# Patient Record
Sex: FEMALE | Race: OTHER | HISPANIC OR LATINO | Employment: UNEMPLOYED | URBAN - METROPOLITAN AREA
[De-identification: names, ages, dates, MRNs, and addresses within clinical notes are randomized per-mention and may not be internally consistent; named-entity substitution may affect disease eponyms.]

---

## 2018-03-20 LAB — PROLACTIN (HISTORICAL): 16 NG/ML

## 2018-05-17 LAB
ALBUMIN SERPL BCP-MCNC: 4.7 G/DL (ref 3–5.2)
ALP SERPL-CCNC: 62 U/L (ref 43–122)
ALT SERPL W P-5'-P-CCNC: 22 U/L (ref 9–52)
AMORPHOUS MATERIAL (HISTORICAL): ABNORMAL
AMPHETAMINE URINE (HISTORICAL): NEGATIVE
ANION GAP SERPL CALCULATED.3IONS-SCNC: 9 MMOL/L (ref 5–14)
AST SERPL W P-5'-P-CCNC: 22 U/L (ref 14–36)
BACTERIA UR QL AUTO: ABNORMAL
BARBITURATE URINE (HISTORICAL): NEGATIVE
BENZODIAZEPINE URINE (HISTORICAL): POSITIVE
BILIRUB SERPL-MCNC: 1.1 MG/DL
BILIRUB UR QL STRIP: NEGATIVE MG/DL
BUN SERPL-MCNC: 17 MG/DL (ref 5–25)
CALCIUM SERPL-MCNC: 9.4 MG/DL (ref 8.4–10.2)
CASTS/CASTS TYPE (HISTORICAL): ABNORMAL /LPF
CHLORIDE SERPL-SCNC: 105 MEQ/L (ref 97–108)
CLARITY UR: CLEAR
CO2 SERPL-SCNC: 23 MMOL/L (ref 22–30)
COCAINE (METAB.), URINE (HISTORICAL): POSITIVE
COLOR UR: ABNORMAL
CREATINE, SERUM (HISTORICAL): 0.77 MG/DL (ref 0.6–1.2)
CRYSTAL TYPE (HISTORICAL): ABNORMAL /HPF
DEPRECATED RDW RBC AUTO: 12.9 %
DRUG COMMENT (HISTORICAL): ABNORMAL
EGFR (HISTORICAL): >60 ML/MIN/1.73 M2
GLUCOSE SERPL-MCNC: 85 MG/DL (ref 70–99)
GLUCOSE UR STRIP-MCNC: NEGATIVE MG/DL
HCT VFR BLD AUTO: 38.9 % (ref 36–46)
HGB BLD-MCNC: 12.9 G/DL (ref 12–16)
HGB UR QL STRIP.AUTO: ABNORMAL
KETONES UR STRIP-MCNC: 15 MG/DL
LEUKOCYTE ESTERASE UR QL STRIP: ABNORMAL
MCH RBC QN AUTO: 31.9 PG (ref 26–34)
MCHC RBC AUTO-ENTMCNC: 33.2 % (ref 31–36)
MCV RBC AUTO: 96 FL (ref 80–100)
MDMA (GC/MS) (HISTORICAL): NEGATIVE
METHADONE URINE (HISTORICAL): NEGATIVE
METHAMPHETAMINE URINE (HISTORICAL): NEGATIVE
MUCOUS THREADS URNS QL MICRO: ABNORMAL
NITRITE UR QL STRIP: NEGATIVE
NON-SQ EPI CELLS URNS QL MICRO: ABNORMAL
OPIATES (HISTORICAL): NEGATIVE
OTHER STN SPEC: ABNORMAL
OXYCODONE (HISTORICAL): NEGATIVE
PH UR STRIP.AUTO: 5 [PH] (ref 4.5–8)
PHENCYCLIDINE URINE (HISTORICAL): NEGATIVE
PLATELET # BLD AUTO: 165 K/MCL (ref 150–450)
POTASSIUM SERPL-SCNC: 4.2 MEQ/L (ref 3.6–5)
PREGNANCY TEST URINE (HISTORICAL): NEGATIVE
PROT UR STRIP-MCNC: >=500 MG/DL
RBC # BLD AUTO: 4.05 M/MCL (ref 4–5.2)
RBC #/AREA URNS AUTO: 2 /HPF
SODIUM SERPL-SCNC: 137 MEQ/L (ref 137–147)
SP GR UR STRIP.AUTO: 1.02 (ref 1–1.04)
THC URINE (HISTORICAL): POSITIVE
TOTAL PROTEIN (HISTORICAL): 7.5 G/DL (ref 5.9–8.4)
TRICYCLICS URINE (HISTORICAL): NEGATIVE
TSH SERPL DL<=0.05 MIU/L-ACNC: 0.85 UIU/ML (ref 0.47–4.68)
UROBILINOGEN UR QL STRIP.AUTO: 1 MG/DL (ref 0–1)
WBC # BLD AUTO: 7.1 K/MCL (ref 4.5–11)
WBC #/AREA URNS AUTO: 5 /HPF

## 2018-07-15 ENCOUNTER — HOSPITAL ENCOUNTER (EMERGENCY)
Facility: HOSPITAL | Age: 23
Discharge: HOME | End: 2018-07-15
Attending: EMERGENCY MEDICINE | Admitting: EMERGENCY MEDICINE
Payer: COMMERCIAL

## 2018-07-15 VITALS
SYSTOLIC BLOOD PRESSURE: 110 MMHG | RESPIRATION RATE: 18 BRPM | TEMPERATURE: 97.6 F | WEIGHT: 101 LBS | HEART RATE: 82 BPM | OXYGEN SATURATION: 100 % | DIASTOLIC BLOOD PRESSURE: 60 MMHG

## 2018-07-15 DIAGNOSIS — N10 ACUTE PYELONEPHRITIS: Primary | ICD-10-CM

## 2018-07-15 LAB
BACTERIA UR QL AUTO: ABNORMAL /HPF
BILIRUB UR QL STRIP: NEGATIVE
CLARITY UR: ABNORMAL
COLOR UR: YELLOW
EXT PREG TEST URINE: NORMAL
GLUCOSE UR STRIP-MCNC: NEGATIVE MG/DL
HGB UR QL STRIP.AUTO: 250
KETONES UR STRIP-MCNC: ABNORMAL MG/DL
LEUKOCYTE ESTERASE UR QL STRIP: 500
NITRITE UR QL STRIP: POSITIVE
NON-SQ EPI CELLS URNS QL MICRO: ABNORMAL /HPF
PH UR STRIP.AUTO: 5 [PH] (ref 4.5–8)
PROT UR STRIP-MCNC: >=500 MG/DL
RBC #/AREA URNS AUTO: ABNORMAL /HPF
SP GR UR STRIP.AUTO: 1.02 (ref 1–1.04)
UROBILINOGEN UA: NEGATIVE MG/DL
WBC #/AREA URNS AUTO: ABNORMAL /HPF

## 2018-07-15 PROCEDURE — 81001 URINALYSIS AUTO W/SCOPE: CPT | Performed by: PHYSICIAN ASSISTANT

## 2018-07-15 PROCEDURE — 81025 URINE PREGNANCY TEST: CPT | Performed by: PHYSICIAN ASSISTANT

## 2018-07-15 PROCEDURE — 99283 EMERGENCY DEPT VISIT LOW MDM: CPT

## 2018-07-15 RX ORDER — METHOCARBAMOL 500 MG/1
TABLET, FILM COATED ORAL
Status: DISPENSED
Start: 2018-07-15 | End: 2018-07-16

## 2018-07-15 RX ORDER — TRAZODONE HYDROCHLORIDE 50 MG/1
50 TABLET ORAL
COMMUNITY

## 2018-07-15 RX ORDER — CEPHALEXIN 500 MG/1
500 CAPSULE ORAL ONCE
Status: COMPLETED | OUTPATIENT
Start: 2018-07-15 | End: 2018-07-15

## 2018-07-15 RX ORDER — CEPHALEXIN 500 MG/1
CAPSULE ORAL
Status: DISPENSED
Start: 2018-07-15 | End: 2018-07-16

## 2018-07-15 RX ORDER — CEPHALEXIN 500 MG/1
500 CAPSULE ORAL EVERY 8 HOURS SCHEDULED
Qty: 21 CAPSULE | Refills: 0 | Status: SHIPPED | OUTPATIENT
Start: 2018-07-15 | End: 2018-07-22

## 2018-07-15 RX ORDER — METHOCARBAMOL 500 MG/1
500 TABLET, FILM COATED ORAL ONCE
Status: COMPLETED | OUTPATIENT
Start: 2018-07-15 | End: 2018-07-15

## 2018-07-15 RX ORDER — VALACYCLOVIR HYDROCHLORIDE 500 MG/1
500 TABLET, FILM COATED ORAL DAILY
COMMUNITY
Start: 2018-03-13

## 2018-07-15 RX ORDER — PRAZOSIN HYDROCHLORIDE 2 MG/1
2 CAPSULE ORAL
COMMUNITY

## 2018-07-15 RX ADMIN — METHOCARBAMOL 500 MG: 500 TABLET ORAL at 23:10

## 2018-07-15 RX ADMIN — CEPHALEXIN 500 MG: 500 CAPSULE ORAL at 23:32

## 2018-07-16 NOTE — ED NOTES
Patient reports left lumbar back pain since yesterday, reported 10/10  Patient denies abdominal pain, burning with urination  Patient reports she woke up with the back pain  Patient denies any heavy lifting, twisting  Reports the pain is intermittent, goes away for about 5 min and then comes back  Reports movement makes pain worse  Took "sometime" before she came but does not know what       Joleen Lester, RN  07/15/18 3640 [facility], RN  07/15/18 5725

## 2018-07-16 NOTE — DISCHARGE INSTRUCTIONS
Kidney Infection   WHAT YOU NEED TO KNOW:   A kidney infection, or pyelonephritis, is a bacterial infection  The infection usually starts in your bladder or urethra and moves into your kidney  One or both kidneys may be infected  DISCHARGE INSTRUCTIONS:   Return to the emergency department if:   · You have a fever and chills  · You cannot stop vomiting  · You have severe pain in your abdomen, lower back, or sides  Contact your healthcare provider if:   · You continue to have a fever after you take antibiotics for 3 days  · You have pain when you urinate, even after treatment  · Your signs and symptoms return  · You have questions or concerns about your condition or care  Medicines: You may  need any of the following:  · Antibiotics  treat your bacterial infection  · Acetaminophen  decreases pain and fever  It is available without a doctor's order  Ask how much to take and how often to take it  Follow directions  Read the labels of all other medicines you are using to see if they also contain acetaminophen, or ask your doctor or pharmacist  Acetaminophen can cause liver damage if not taken correctly  Do not use more than 4 grams (4,000 milligrams) total of acetaminophen in one day  · NSAIDs , such as ibuprofen, help decrease swelling, pain, and fever  This medicine is available with or without a doctor's order  NSAIDs can cause stomach bleeding or kidney problems in certain people  If you take blood thinner medicine, always ask if NSAIDs are safe for you  Always read the medicine label and follow directions  Do not give these medicines to children under 10months of age without direction from your child's healthcare provider  · Prescription pain medicine  may be given  Ask how to take this medicine safely  · Take your medicine as directed  Contact your healthcare provider if you think your medicine is not helping or if you have side effects   Tell him of her if you are allergic to any medicine  Keep a list of the medicines, vitamins, and herbs you take  Include the amounts, and when and why you take them  Bring the list or the pill bottles to follow-up visits  Carry your medicine list with you in case of an emergency  Drink liquids as directed: You may need to drink extra liquids to help flush your kidneys and urinary system  Water is the best liquid to drink  Ask your healthcare provider how much liquid to drink each day and which liquids are best for you  Urinate as soon as you feel the urge: This will help flush bacteria from your urinary system  Do not wait or hold your urine for too long  Clean your genital area every day with soap and water:  Wipe from front to back after you urinate or have a bowel movement  Wear cotton underwear  Fabrics such as nylon and polyester can stay damp  This can increase your risk for infection  Urinate within 15 minutes after you have sex  Follow up with your healthcare provider as directed:  Write down your questions so you remember to ask them during your visits  © 2017 [facility] Information is for End User's use only and may not be sold, redistributed or otherwise used for commercial purposes  All illustrations and images included in CareNotes® are the copyrighted property of A D A M [employer]  or [employer]  The above information is an  only  It is not intended as medical advice for individual conditions or treatments  Talk to your doctor, nurse or pharmacist before following any medical regimen to see if it is safe and effective for you

## 2018-07-16 NOTE — ED PROVIDER NOTES
History  Chief Complaint   Patient presents with    Back Pain     "I am having lower back pain in the back left side "     27-year-old otherwise healthy female presents to the emergency department for evaluation of acute nontraumatic left-sided back and flank pain x1 day  Pain began abruptly, has worsened since onset, currently rated 10/10  She describes sharp nonradiating pain to the left lower  She has taken 1 dose of ibuprofen without pain relief denies associated fever, chills, sweats, however she does note that she had 1 episode of nonbilious, nonbloody emesis yesterday  Denies associated lower urinary tract symptoms, vaginal discharge or bleeding  Has never experienced pain like this in the past   No history of abdominal surgeries  Last menstrual period was 2 weeks prior  Prior to Admission Medications   Prescriptions Last Dose Informant Patient Reported? Taking?   prazosin (MINIPRESS) 2 mg capsule Unknown at Unknown time  Yes No   Sig: Take 2 mg by mouth daily at bedtime   traZODone (DESYREL) 50 mg tablet Unknown at Unknown time  Yes No   Sig: Take 50 mg by mouth daily at bedtime   valACYclovir (VALTREX) 500 mg tablet   Yes Yes   Sig: Take 500 mg by mouth daily      Facility-Administered Medications: None       History reviewed  No pertinent past medical history  History reviewed  No pertinent surgical history  History reviewed  No pertinent family history  I have reviewed and agree with the history as documented  Social History   Substance Use Topics    Smoking status: Never Smoker    Smokeless tobacco: Never Used    Alcohol use No        Review of Systems   Constitutional: Positive for chills  Negative for appetite change, diaphoresis and fever  Respiratory: Negative for shortness of breath  Cardiovascular: Negative for chest pain  Gastrointestinal: Positive for nausea and vomiting (once)  Negative for abdominal pain and diarrhea     Genitourinary: Negative for difficulty urinating, dysuria, frequency and urgency  Musculoskeletal: Positive for back pain  Negative for arthralgias and myalgias  Skin: Negative for color change and rash  Allergic/Immunologic: Negative for immunocompromised state  Neurological: Negative for weakness and numbness  Psychiatric/Behavioral: Negative for confusion  Physical Exam  Physical Exam   Constitutional: She is oriented to person, place, and time  She appears well-developed and well-nourished  No distress  HENT:   Head: Normocephalic and atraumatic  Eyes: Pupils are equal, round, and reactive to light  No scleral icterus  Cardiovascular: Normal rate and regular rhythm  Exam reveals no gallop and no friction rub  No murmur heard  Pulmonary/Chest: No respiratory distress  She has no wheezes  She has no rales  Abdominal: Soft  Bowel sounds are normal  She exhibits no distension and no mass  There is tenderness (suprapubic, LUQ)  There is no rebound, no guarding and no CVA tenderness  Musculoskeletal:        Lumbar back: She exhibits tenderness (L paraspinal)  She exhibits normal range of motion and no bony tenderness  Neurological: She is alert and oriented to person, place, and time  Skin: Skin is warm and dry  Capillary refill takes less than 2 seconds  She is not diaphoretic  Psychiatric: She has a normal mood and affect  Her behavior is normal    Vitals reviewed        Vital Signs  ED Triage Vitals [07/15/18 2247]   Temperature Pulse Respirations Blood Pressure SpO2   97 6 °F (36 4 °C) 82 18 110/60 100 %      Temp Source Heart Rate Source Patient Position - Orthostatic VS BP Location FiO2 (%)   Temporal Monitor Sitting Left arm --      Pain Score       Worst Possible Pain           Vitals:    07/15/18 2247   BP: 110/60   Pulse: 82   Patient Position - Orthostatic VS: Sitting       Visual Acuity      ED Medications  Medications   methocarbamol (ROBAXIN) tablet 500 mg (500 mg Oral Given 7/15/18 2310) cephalexin (KEFLEX) capsule 500 mg (500 mg Oral Given 7/15/18 2332)       Diagnostic Studies  Results Reviewed     Procedure Component Value Units Date/Time    Urine Microscopic [[id number]]  (Abnormal) Collected:  07/15/18 2305    Lab Status:  Final result Specimen:  Urine from Urine, Clean Catch Updated:  07/15/18 2321     RBC, UA 20-30 (A) /hpf      WBC, UA 30-50 (A) /hpf      Epithelial Cells Moderate (A) /hpf      Bacteria, UA Innumerable (A) /hpf     UA w Reflex to Microscopic [[id number]]  (Abnormal) Collected:  07/15/18 2305    Lab Status:  Final result Specimen:  Urine from Urine, Clean Catch Updated:  07/15/18 2317     Color, UA Yellow     Clarity, UA Cloudy (A)     Specific Gravity, UA 1 025     pH, UA 5 0     Leukocytes,  0 (A)     Nitrite, UA Positive (A)     Protein, UA >=500 (A) mg/dl      Glucose, UA Negative mg/dl      Ketones, UA 5 (Trace) (A) mg/dl      Bilirubin, UA Negative     Blood,  0 (A)     UROBILINOGEN UA Negative mg/dL     POCT pregnancy, urine [[id number]]  (Normal) Resulted:  07/15/18 2310    Lab Status:  Final result Updated:  07/15/18 2310     EXT PREG TEST UR (Ref: Negative) neg                 No orders to display              Procedures  Procedures       Phone Contacts  ED Phone Contact    ED Course                               MDM  Number of Diagnoses or Management Options  Acute pyelonephritis: new and requires workup  Diagnosis management comments: 79-year-old previously healthy female presents to the emergency department for acute onset of left flank pain times 24 hours  She is well appearing, afebrile, with easily reproduce left-sided flank pain on exam   Vital signs are unremarkable  Urinalysis is markedly positive for nitrites and leukocytes, patient will be treated empirically for pyelonephritis  First dose of antibiotics given in the emergency department, patient discharged with prescription for cephalexin         Amount and/or Complexity of Data Reviewed  Clinical lab tests: ordered and reviewed  Tests in the medicine section of CPT®: ordered and reviewed  Review and summarize past medical records: yes      CritCare Time    Disposition  Final diagnoses:   Acute pyelonephritis     Time reflects when diagnosis was documented in both MDM as applicable and the Disposition within this note     Time User Action Codes Description Comment    7/15/2018 11:18 PM Hernandez Cm Add [N10] Acute pyelonephritis       ED Disposition     ED Disposition Condition Comment    Discharge  Tessy Baker discharge to home/self care  Condition at discharge: Good        Follow-up Information     Follow up With Specialties Details Why Contact Info    Nasra Gutiérrez MD Internal Medicine Schedule an appointment as soon as possible for a visit  [facility] [address] [facility]  [phone]            Discharge Medication List as of 7/15/2018 11:19 PM      START taking these medications    Details   cephalexin (KEFLEX) 500 mg capsule Take 1 capsule (500 mg total) by mouth every 8 (eight) hours for 7 days, Starting Sun 7/15/2018, Until Sun 7/22/2018, Print         CONTINUE these medications which have NOT CHANGED    Details   valACYclovir (VALTREX) 500 mg tablet Take 500 mg by mouth daily, Starting Tue 3/13/2018, Historical Med      prazosin (MINIPRESS) 2 mg capsule Take 2 mg by mouth daily at bedtime, Historical Med      traZODone (DESYREL) 50 mg tablet Take 50 mg by mouth daily at bedtime, Historical Med           No discharge procedures on file      ED Provider  Electronically Signed by           Benito Chirinos PA-C  07/15/18 8153

## 2018-10-21 ENCOUNTER — APPOINTMENT (EMERGENCY)
Dept: RADIOLOGY | Facility: HOSPITAL | Age: 23
End: 2018-10-21
Payer: COMMERCIAL

## 2018-10-21 ENCOUNTER — HOSPITAL ENCOUNTER (EMERGENCY)
Facility: HOSPITAL | Age: 23
Discharge: HOME | End: 2018-10-21
Attending: EMERGENCY MEDICINE | Admitting: EMERGENCY MEDICINE
Payer: COMMERCIAL

## 2018-10-21 VITALS
HEART RATE: 71 BPM | DIASTOLIC BLOOD PRESSURE: 97 MMHG | SYSTOLIC BLOOD PRESSURE: 131 MMHG | TEMPERATURE: 98 F | OXYGEN SATURATION: 100 % | RESPIRATION RATE: 16 BRPM

## 2018-10-21 DIAGNOSIS — R51.9 HEADACHE: Primary | ICD-10-CM

## 2018-10-21 DIAGNOSIS — L03.90 CELLULITIS: ICD-10-CM

## 2018-10-21 LAB
ANION GAP SERPL CALCULATED.3IONS-SCNC: 10 MMOL/L (ref 5–14)
BUN SERPL-MCNC: 12 MG/DL (ref 5–25)
CALCIUM SERPL-MCNC: 9.1 MG/DL (ref 8.4–10.2)
CHLORIDE SERPL-SCNC: 102 MMOL/L (ref 97–108)
CO2 SERPL-SCNC: 24 MMOL/L (ref 22–30)
CREAT SERPL-MCNC: 0.65 MG/DL (ref 0.6–1.2)
ERYTHROCYTE [DISTWIDTH] IN BLOOD BY AUTOMATED COUNT: 12.7 %
EXT PREG TEST URINE: NORMAL
GFR SERPL CREATININE-BSD FRML MDRD: 127 ML/MIN/1.73SQ M
GLUCOSE SERPL-MCNC: 97 MG/DL (ref 70–99)
HCT VFR BLD AUTO: 39.8 % (ref 36–46)
HGB BLD-MCNC: 13.6 G/DL (ref 12–16)
LYMPHOCYTES # BLD AUTO: 0.85 THOUSAND/UL (ref 0.5–4)
LYMPHOCYTES # BLD AUTO: 23 % (ref 20–50)
MCH RBC QN AUTO: 32.1 PG (ref 26–34)
MCHC RBC AUTO-ENTMCNC: 34.2 G/DL (ref 31–36)
MCV RBC AUTO: 94 FL (ref 80–100)
MONOCYTES # BLD AUTO: 0.52 THOUSAND/UL (ref 0.2–0.9)
MONOCYTES NFR BLD AUTO: 14 % (ref 1–10)
NEUTS BAND NFR BLD MANUAL: 2 % (ref 0–8)
NEUTS SEG # BLD: 2.33 THOUSAND/UL (ref 1.8–7.8)
NEUTS SEG NFR BLD AUTO: 61 %
PLATELET # BLD AUTO: 118 THOUSANDS/UL (ref 150–450)
PLATELET BLD QL SMEAR: ADEQUATE
PMV BLD AUTO: 10 FL (ref 8.9–12.7)
POTASSIUM SERPL-SCNC: 4 MMOL/L (ref 3.6–5)
RBC # BLD AUTO: 4.24 MILLION/UL (ref 4–5.2)
RBC MORPH BLD: NORMAL
SODIUM SERPL-SCNC: 136 MMOL/L (ref 137–147)
TOTAL CELLS COUNTED SPEC: 100
WBC # BLD AUTO: 3.7 THOUSAND/UL (ref 4.5–11)

## 2018-10-21 PROCEDURE — 85007 BL SMEAR W/DIFF WBC COUNT: CPT | Performed by: PHYSICIAN ASSISTANT

## 2018-10-21 PROCEDURE — 80048 BASIC METABOLIC PNL TOTAL CA: CPT | Performed by: PHYSICIAN ASSISTANT

## 2018-10-21 PROCEDURE — 96365 THER/PROPH/DIAG IV INF INIT: CPT

## 2018-10-21 PROCEDURE — 36415 COLL VENOUS BLD VENIPUNCTURE: CPT | Performed by: PHYSICIAN ASSISTANT

## 2018-10-21 PROCEDURE — 85027 COMPLETE CBC AUTOMATED: CPT | Performed by: PHYSICIAN ASSISTANT

## 2018-10-21 PROCEDURE — 81025 URINE PREGNANCY TEST: CPT | Performed by: PHYSICIAN ASSISTANT

## 2018-10-21 PROCEDURE — 71046 X-RAY EXAM CHEST 2 VIEWS: CPT

## 2018-10-21 PROCEDURE — 87040 BLOOD CULTURE FOR BACTERIA: CPT | Performed by: PHYSICIAN ASSISTANT

## 2018-10-21 PROCEDURE — 96375 TX/PRO/DX INJ NEW DRUG ADDON: CPT

## 2018-10-21 PROCEDURE — 99283 EMERGENCY DEPT VISIT LOW MDM: CPT

## 2018-10-21 PROCEDURE — 96361 HYDRATE IV INFUSION ADD-ON: CPT

## 2018-10-21 RX ORDER — SODIUM CHLORIDE 9 MG/ML
250 INJECTION, SOLUTION INTRAVENOUS CONTINUOUS
Status: DISCONTINUED | OUTPATIENT
Start: 2018-10-21 | End: 2018-10-21 | Stop reason: HOSPADM

## 2018-10-21 RX ORDER — ALBUTEROL SULFATE 2.5 MG/3ML
SOLUTION RESPIRATORY (INHALATION)
Status: COMPLETED
Start: 2018-10-21 | End: 2018-10-21

## 2018-10-21 RX ORDER — KETOROLAC TROMETHAMINE 30 MG/ML
15 INJECTION, SOLUTION INTRAMUSCULAR; INTRAVENOUS ONCE
Status: COMPLETED | OUTPATIENT
Start: 2018-10-21 | End: 2018-10-21

## 2018-10-21 RX ORDER — DIPHENHYDRAMINE HYDROCHLORIDE 50 MG/ML
INJECTION INTRAMUSCULAR; INTRAVENOUS
Status: COMPLETED
Start: 2018-10-21 | End: 2018-10-21

## 2018-10-21 RX ORDER — CLINDAMYCIN PHOSPHATE 600 MG/50ML
600 INJECTION INTRAVENOUS ONCE
Status: COMPLETED | OUTPATIENT
Start: 2018-10-21 | End: 2018-10-21

## 2018-10-21 RX ORDER — METOCLOPRAMIDE HYDROCHLORIDE 5 MG/ML
INJECTION INTRAMUSCULAR; INTRAVENOUS
Status: COMPLETED
Start: 2018-10-21 | End: 2018-10-21

## 2018-10-21 RX ORDER — KETOROLAC TROMETHAMINE 30 MG/ML
INJECTION, SOLUTION INTRAMUSCULAR; INTRAVENOUS
Status: COMPLETED
Start: 2018-10-21 | End: 2018-10-21

## 2018-10-21 RX ORDER — CEPHALEXIN 500 MG/1
500 CAPSULE ORAL EVERY 8 HOURS SCHEDULED
Qty: 30 CAPSULE | Refills: 0 | Status: SHIPPED | OUTPATIENT
Start: 2018-10-21 | End: 2018-10-31

## 2018-10-21 RX ORDER — IBUPROFEN 600 MG/1
600 TABLET ORAL EVERY 6 HOURS PRN
Qty: 30 TABLET | Refills: 0 | Status: SHIPPED | OUTPATIENT
Start: 2018-10-21 | End: 2019-04-22

## 2018-10-21 RX ORDER — DIPHENHYDRAMINE HYDROCHLORIDE 50 MG/ML
25 INJECTION INTRAMUSCULAR; INTRAVENOUS ONCE
Status: COMPLETED | OUTPATIENT
Start: 2018-10-21 | End: 2018-10-21

## 2018-10-21 RX ORDER — CLINDAMYCIN PHOSPHATE 600 MG/50ML
INJECTION INTRAVENOUS
Status: COMPLETED
Start: 2018-10-21 | End: 2018-10-21

## 2018-10-21 RX ORDER — SULFAMETHOXAZOLE AND TRIMETHOPRIM 800; 160 MG/1; MG/1
1 TABLET ORAL 2 TIMES DAILY
Qty: 14 TABLET | Refills: 0 | Status: SHIPPED | OUTPATIENT
Start: 2018-10-21 | End: 2018-10-28

## 2018-10-21 RX ORDER — ALBUTEROL SULFATE 2.5 MG/3ML
2.5 SOLUTION RESPIRATORY (INHALATION) ONCE
Status: COMPLETED | OUTPATIENT
Start: 2018-10-21 | End: 2018-10-21

## 2018-10-21 RX ORDER — METOCLOPRAMIDE HYDROCHLORIDE 5 MG/ML
5 INJECTION INTRAMUSCULAR; INTRAVENOUS ONCE
Status: COMPLETED | OUTPATIENT
Start: 2018-10-21 | End: 2018-10-21

## 2018-10-21 RX ADMIN — ALBUTEROL SULFATE 2.5 MG: 2.5 SOLUTION RESPIRATORY (INHALATION) at 13:47

## 2018-10-21 RX ADMIN — DIPHENHYDRAMINE HYDROCHLORIDE 25 MG: 50 INJECTION INTRAMUSCULAR; INTRAVENOUS at 14:29

## 2018-10-21 RX ADMIN — CLINDAMYCIN PHOSPHATE 600 MG: 600 INJECTION INTRAVENOUS at 14:00

## 2018-10-21 RX ADMIN — CLINDAMYCIN IN 5 PERCENT DEXTROSE 600 MG: 12 INJECTION, SOLUTION INTRAVENOUS at 14:00

## 2018-10-21 RX ADMIN — KETOROLAC TROMETHAMINE 30 MG: 30 INJECTION, SOLUTION INTRAMUSCULAR; INTRAVENOUS at 15:42

## 2018-10-21 RX ADMIN — METOCLOPRAMIDE HYDROCHLORIDE 5 MG: 5 INJECTION INTRAMUSCULAR; INTRAVENOUS at 14:28

## 2018-10-21 RX ADMIN — METOCLOPRAMIDE 5 MG: 5 INJECTION, SOLUTION INTRAMUSCULAR; INTRAVENOUS at 14:28

## 2018-10-21 RX ADMIN — SODIUM CHLORIDE 250 ML/HR: 9 INJECTION, SOLUTION INTRAVENOUS at 14:28

## 2018-10-21 NOTE — ED NOTES
Pt  In room arguing with female in room       Rick Asher, [facility]  10/21/18 5178

## 2018-10-21 NOTE — ED PROVIDER NOTES
History  Chief Complaint   Patient presents with    Mass     Found 4 days ago under left armpit  Advil,motrin and tylenol  No meds taken today  Girlfriend states "Her arm is getting swollen on that side"   Headache     Frontal headache for 4 days, complains of dizziness and nausea  No vomiting  History provided by:  Patient   used: No    Medical Problem   Location:  Pt with left shoulder axilla chest wall pt  pt squeezed a pimple a few days ago at same area  4 days ago pt also with frontal headache for 4 days   Severity:  Mild  Duration:  4 days  Timing:  Constant  Progression:  Unchanged  Chronicity:  New  Associated symptoms: headaches and rash    Associated symptoms: no abdominal pain, no chest pain, no congestion, no cough, no diarrhea, no ear pain, no fatigue, no fever, no loss of consciousness, no myalgias, no nausea, no rhinorrhea, no shortness of breath, no sore throat, no vomiting and no wheezing        Prior to Admission Medications   Prescriptions Last Dose Informant Patient Reported? Taking?   prazosin (MINIPRESS) 2 mg capsule   Yes No   Sig: Take 2 mg by mouth daily at bedtime   traZODone (DESYREL) 50 mg tablet   Yes No   Sig: Take 50 mg by mouth daily at bedtime   valACYclovir (VALTREX) 500 mg tablet   Yes No   Sig: Take 500 mg by mouth daily      Facility-Administered Medications: None       History reviewed  No pertinent past medical history  History reviewed  No pertinent surgical history  History reviewed  No pertinent family history  I have reviewed and agree with the history as documented  Social History   Substance Use Topics    Smoking status: Never Smoker    Smokeless tobacco: Never Used    Alcohol use No        Review of Systems   Constitutional: Negative  Negative for fatigue and fever  HENT: Negative  Negative for congestion, ear pain, rhinorrhea and sore throat  Eyes: Negative  Respiratory: Negative    Negative for cough, shortness of breath and wheezing  Cardiovascular: Negative  Negative for chest pain  Gastrointestinal: Negative  Negative for abdominal pain, diarrhea, nausea and vomiting  Endocrine: Negative  Genitourinary: Negative  Musculoskeletal: Negative  Negative for myalgias  Skin: Positive for rash  Allergic/Immunologic: Negative  Neurological: Positive for headaches  Negative for loss of consciousness  Hematological: Negative  Psychiatric/Behavioral: Negative  All other systems reviewed and are negative  Physical Exam  Physical Exam   Constitutional: She is oriented to person, place, and time  She appears well-developed and well-nourished  Headache is frontal     310pm  Pt h/a  Much improved  Pt and I discussed ct scan will hold off with her feeling better    HENT:   Head: Normocephalic and atraumatic  Right Ear: External ear normal    Left Ear: External ear normal    Nose: Nose normal    Mouth/Throat: Oropharynx is clear and moist    Eyes: Pupils are equal, round, and reactive to light  Conjunctivae and EOM are normal    Neck: Normal range of motion  Neck supple  Cardiovascular: Normal rate, regular rhythm and normal heart sounds  Pulmonary/Chest: Effort normal and breath sounds normal    Abdominal: Soft  Bowel sounds are normal    Musculoskeletal: Normal range of motion  Neurological: She is alert and oriented to person, place, and time  Skin:   Left anterior axilla tender left anterior chest wal pain tender to palp  Small erythema   No swelling   Minor lateral breast tender no swelling no erythema    Psychiatric: She has a normal mood and affect  Her behavior is normal    Nursing note and vitals reviewed        Vital Signs  ED Triage Vitals   Temperature Pulse Respirations Blood Pressure SpO2   10/21/18 1243 10/21/18 1243 10/21/18 1243 10/21/18 1243 10/21/18 1243   98 °F (36 7 °C) 94 18 141/87 98 %      Temp Source Heart Rate Source Patient Position - Orthostatic VS BP Location FiO2 (%)   10/21/18 1243 10/21/18 1546 10/21/18 1546 10/21/18 1546 --   Tympanic Monitor Lying Left arm       Pain Score       --                  Vitals:    10/21/18 1243 10/21/18 1546   BP: 141/87 131/97   Pulse: 94 71   Patient Position - Orthostatic VS:  Lying       Visual Acuity      ED Medications  Medications   sodium chloride 0 9 % infusion (250 mL/hr Intravenous New Bag 10/21/18 1428)   metoclopramide (REGLAN) injection 5 mg (5 mg Intravenous Given 10/21/18 1428)   diphenhydrAMINE (BENADRYL) injection 25 mg (25 mg Intravenous Given 10/21/18 1429)   clindamycin (CLEOCIN) IVPB (premix) 600 mg (0 mg Intravenous Stopped 10/21/18 1429)   albuterol inhalation solution 2 5 mg (2 5 mg Nebulization Given 10/21/18 1347)   ketorolac (TORADOL) injection 15 mg (30 mg Intravenous Given 10/21/18 1542)       Diagnostic Studies  Results Reviewed     Procedure Component Value Units Date/Time    Basic metabolic panel [[id number]]  (Abnormal) Collected:  10/21/18 1345    Lab Status:  Final result Specimen:  Blood from Arm, Right Updated:  10/21/18 1415     Sodium 136 (L) mmol/L      Potassium 4 0 mmol/L      Chloride 102 mmol/L      CO2 24 mmol/L      ANION GAP 10 mmol/L      BUN 12 mg/dL      Creatinine 0 65 mg/dL      Glucose 97 mg/dL      Calcium 9 1 mg/dL      eGFR 127 ml/min/1 73sq m     Narrative:       Hemolysis  National Kidney Disease Education Program recommendations are as follows:  GFR calculation is accurate only with a steady state creatinine  Chronic Kidney disease less than 60 ml/min/1 73 sq  meters  Kidney failure less than 15 ml/min/1 73 sq  meters      CBC and differential [[id number]]  (Abnormal) Collected:  10/21/18 1345    Lab Status:  Final result Specimen:  Blood from Arm, Right Updated:  10/21/18 1410     WBC 3 70 (L) Thousand/uL      RBC 4 24 Million/uL      Hemoglobin 13 6 g/dL      Hematocrit 39 8 %      MCV 94 fL      MCH 32 1 pg      MCHC 34 2 g/dL      RDW 12 7 %      MPV 10 0 fL      Platelets 071 (L) Thousands/uL     Blood culture #2 [[id number]] Collected:  10/21/18 1345    Lab Status: In process Specimen:  Blood from Arm, Right Updated:  10/21/18 1400    Blood culture #1 [[id number]] Collected:  10/21/18 1345    Lab Status: In process Specimen:  Blood from Arm, Right Updated:  10/21/18 1400    POCT pregnancy, urine [[id number]]  (Normal) Resulted:  10/21/18 1310    Lab Status:  Final result Updated:  10/21/18 1310     EXT PREG TEST UR (Ref: Negative) Patient pregnancy test is negative                 XR chest 2 views   ED Interpretation by Madison Julio PA-C (10/21 2418)                 Procedures  Procedures       Phone Contacts  ED Phone Contact    ED Course                               MDM  CritCare Time    Disposition  Final diagnoses:   Headache   Cellulitis     Time reflects when diagnosis was documented in both MDM as applicable and the Disposition within this note     Time User Action Codes Description Comment    10/21/2018  3:11 PM Trinidad العلي [R51] Headache     10/21/2018  3:11 PM Hossein House, [address] [N98 22] Cellulitis       ED Disposition     ED Disposition Condition Comment    Discharge  [facility] discharge to home/self care      Condition at discharge: Good        Follow-up Information     Follow up With Specialties Details Why [address] [place] [address] Emergency Department Emergency Medicine In 1 day about 9-10am  [address] [place] [id number] [phone]          Patient's Medications   Discharge Prescriptions    CEPHALEXIN (KEFLEX) 500 MG CAPSULE    Take 1 capsule (500 mg total) by mouth every 8 (eight) hours for 10 days       Start Date: 10/21/2018End Date: 10/31/2018       Order Dose: 500 mg       Quantity: 30 capsule    Refills: 0    IBUPROFEN (MOTRIN) 600 MG TABLET    Take 1 tablet (600 mg total) by mouth every 6 (six) hours as needed (pain)       Start Date: 10/21/2018End Date: --       Order Dose: 600 mg       Quantity: 30 tablet    Refills: 0    SULFAMETHOXAZOLE-TRIMETHOPRIM (BACTRIM DS) 800-160 MG PER TABLET    Take 1 tablet by mouth 2 (two) times a day for 7 days smx-tmp DS (BACTRIM) 800-160 mg tabs (1tab q12 D10)       Start Date: 10/21/2018End Date: 10/28/2018       Order Dose: 1 tablet       Quantity: 14 tablet    Refills: 0     No discharge procedures on file      ED Provider  Electronically Signed by           Luis Gomez PA-C  10/21/18 5493

## 2018-10-21 NOTE — DISCHARGE INSTRUCTIONS
Acute Headache   WHAT YOU NEED TO KNOW:   An acute headache is pain or discomfort that starts suddenly and gets worse quickly  You may have an acute headache only when you feel stress or eat certain foods  Other acute headache pain can happen every day, and sometimes several times a day  DISCHARGE INSTRUCTIONS:   Return to the emergency department if:   · You have severe pain  · You have numbness or weakness on one side of your face or body  · You have a headache that occurs after a blow to the head, a fall, or other trauma  · You have a headache, are forgetful or confused, or have trouble speaking  · You have a headache, stiff neck, and a fever  Contact your healthcare provider if:   · You have a constant headache and are vomiting  · You have a headache each day that does not get better, even after treatment  · You have changes in your headaches, or new symptoms that occur when you have a headache  · You have questions or concerns about your condition or care  Medicines: You may need any of the following:  · Prescription pain medicine  may be given  The medicine your healthcare provider recommends will depend on the kind of headaches you have  You will need to take prescription headache medicines as directed to prevent a problem called rebound headache  These headaches happen with regular use of pain relievers for headache disorders  · NSAIDs , such as ibuprofen, help decrease swelling, pain, and fever  This medicine is available with or without a doctor's order  NSAIDs can cause stomach bleeding or kidney problems in certain people  If you take blood thinner medicine, always ask your healthcare provider if NSAIDs are safe for you  Always read the medicine label and follow directions  · Acetaminophen  decreases pain and fever  It is available without a doctor's order  Ask how much to take and how often to take it  Follow directions   Read the labels of all other medicines you are using to see if they also contain acetaminophen, or ask your doctor or pharmacist  Acetaminophen can cause liver damage if not taken correctly  Do not use more than 3 grams (3,000 milligrams) total of acetaminophen in one day  · Antidepressants  may be given for some kinds of headaches  · Take your medicine as directed  Contact your healthcare provider if you think your medicine is not helping or if you have side effects  Tell him or her if you are allergic to any medicine  Keep a list of the medicines, vitamins, and herbs you take  Include the amounts, and when and why you take them  Bring the list or the pill bottles to follow-up visits  Carry your medicine list with you in case of an emergency  Manage your symptoms:   · Apply heat or ice  on the headache area  Use a heat or ice pack  For an ice pack, you can also put crushed ice in a plastic bag  Cover the pack or bag with a towel before you apply it to your skin  Ice and heat both help decrease pain, and heat also helps decrease muscle spasms  Apply heat for 20 to 30 minutes every 2 hours  Apply ice for 15 to 20 minutes every hour  Apply heat or ice for as long and for as many days as directed  You may alternate heat and ice  · Relax your muscles  Lie down in a comfortable position and close your eyes  Relax your muscles slowly  Start at your toes and work your way up your body  · Keep a record of your headaches  Write down when your headaches start and stop  Include your symptoms and what you were doing when the headache began  Record what you ate or drank for 24 hours before the headache started  Describe the pain and where it hurts  Keep track of what you did to treat your headache and if it worked  Prevent an acute headache:   · Avoid anything that triggers an acute headache  Examples include exposure to chemicals, going to high altitude, or not getting enough sleep  Create a regular sleep routine   Go to sleep at the same time and wake up at the same time each day  Do not use electronic devices before bedtime  These may trigger a headache or prevent you from sleeping well  · Do not smoke  Nicotine and other chemicals in cigarettes and cigars can trigger an acute headache or make it worse  Ask your healthcare provider for information if you currently smoke and need help to quit  E-cigarettes or smokeless tobacco still contain nicotine  Talk to your healthcare provider before you use these products  · Limit alcohol as directed  Alcohol can trigger an acute headache or make it worse  If you have cluster headaches, do not drink alcohol during an episode  For other types of headaches, ask your healthcare provider if it is safe for you to drink alcohol  Ask how much is safe for you to drink, and how often  · Exercise as directed  Exercise can reduce tension and help with headache pain  Aim for 30 minutes of physical activity on most days of the week  Your healthcare provider can help you create an exercise plan  · Eat a variety of healthy foods  Healthy foods include fruits, vegetables, low-fat dairy products, lean meats, fish, whole grains, and cooked beans  Your healthcare provider or dietitian can help you create meals plans if you need to avoid foods that trigger headaches  Follow up with your healthcare provider as directed:  Bring your headache record with you when you see your healthcare provider  Write down your questions so you remember to ask them during your visits  © 2017 [facility] Information is for End User's use only and may not be sold, redistributed or otherwise used for commercial purposes  All illustrations and images included in CareNotes® are the copyrighted property of A D [employer] M [employer]  or [employer]  The above information is an  only  It is not intended as medical advice for individual conditions or treatments   Talk to your doctor, nurse or pharmacist before following any medical regimen to see if it is safe and effective for you  Cellulitis   WHAT YOU NEED TO KNOW:   Cellulitis is a skin infection caused by bacteria  Cellulitis may go away on its own or you may need treatment  Your healthcare provider may draw a [place] around the outside edges of your cellulitis  If your cellulitis spreads, your healthcare provider will see it outside of the [place]  DISCHARGE INSTRUCTIONS:   Call 911 if:   · You have sudden trouble breathing or chest pain  Return to the emergency department if:   · Your wound gets larger and more painful  · You feel a crackling under your skin when you touch it  · You have purple dots or bumps on your skin, or you see bleeding under your skin  · You have new swelling and pain in your legs  · The red, warm, swollen area gets larger  · You see red streaks coming from the infected area  Contact your healthcare provider if:   · You have a fever  · Your fever or pain does not go away or gets worse  · The area does not get smaller after 2 days of antibiotics  · Your skin is flaking or peeling off  · You have questions or concerns about your condition or care  Medicines:   · Antibiotics  help treat the bacterial infection  · NSAIDs , such as ibuprofen, help decrease swelling, pain, and fever  NSAIDs can cause stomach bleeding or kidney problems in certain people  If you take blood thinner medicine, always ask if NSAIDs are safe for you  Always read the medicine label and follow directions  Do not give these medicines to children under 10months of age without direction from your child's healthcare provider  · Acetaminophen  decreases pain and fever  It is available without a doctor's order  Ask how much to take and how often to take it  Follow directions   Read the labels of all other medicines you are using to see if they also contain acetaminophen, or ask your doctor or pharmacist  Acetaminophen can cause liver damage if not taken correctly  Do not use more than 4 grams (4,000 milligrams) total of acetaminophen in one day  · Take your medicine as directed  Contact your healthcare provider if you think your medicine is not helping or if you have side effects  Tell him or her if you are allergic to any medicine  Keep a list of the medicines, vitamins, and herbs you take  Include the amounts, and when and why you take them  Bring the list or the pill bottles to follow-up visits  Carry your medicine list with you in case of an emergency  Self-care:   · Elevate the area above the level of your heart  as often as you can  This will help decrease swelling and pain  Prop the area on pillows or blankets to keep it elevated comfortably  · Clean the area daily until the wound scabs over  Gently wash the area with soap and water  Pat dry  Use dressings as directed  · Place cool or warm, wet cloths on the area as directed  Use clean cloths and clean water  Leave it on the area until the cloth is room temperature  Pat the area dry with a clean, dry cloth  The cloths may help decrease pain  Prevent cellulitis:   · Do not scratch bug bites or areas of injury  You increase your risk for cellulitis by scratching these areas  · Do not share personal items, such as towels, clothing, and razors  · Clean exercise equipment  with germ-killing  before and after you use it  · Wash your hands often  Use soap and water  Wash your hands after you use the bathroom, change a child's diapers, or sneeze  Wash your hands before you prepare or eat food  Use lotion to prevent dry, cracked skin  · Wear pressure stockings as directed  You may be told to wear the stockings if you have peripheral edema  The stockings improve blood flow and decrease swelling  · Treat athlete's foot  This can help prevent the spread of a bacterial skin infection    Follow up with your healthcare provider within 3 days, or as directed: Your healthcare provider will check if your cellulitis is getting better  You may need different medicine  Write down your questions so you remember to ask them during your visits  © 2017 [address] [place] Information is for End User's use only and may not be sold, redistributed or otherwise used for commercial purposes  All illustrations and images included in CareNotes® are the copyrighted property of A D A M , Inc  or [employer]  The above information is an  only  It is not intended as medical advice for individual conditions or treatments  Talk to your doctor, nurse or pharmacist before following any medical regimen to see if it is safe and effective for you

## 2018-10-26 LAB
BACTERIA BLD CULT: NORMAL
BACTERIA BLD CULT: NORMAL

## 2019-03-26 ENCOUNTER — HOSPITAL ENCOUNTER (EMERGENCY)
Facility: HOSPITAL | Age: 24
Discharge: HOME | End: 2019-03-26
Attending: EMERGENCY MEDICINE
Payer: COMMERCIAL

## 2019-03-26 ENCOUNTER — APPOINTMENT (EMERGENCY)
Dept: RADIOLOGY | Facility: HOSPITAL | Age: 24
End: 2019-03-26
Payer: COMMERCIAL

## 2019-03-26 VITALS
SYSTOLIC BLOOD PRESSURE: 99 MMHG | DIASTOLIC BLOOD PRESSURE: 50 MMHG | RESPIRATION RATE: 16 BRPM | HEART RATE: 87 BPM | OXYGEN SATURATION: 97 % | WEIGHT: 111.77 LBS | TEMPERATURE: 98.3 F

## 2019-03-26 DIAGNOSIS — M79.674 GREAT TOE PAIN, RIGHT: Primary | ICD-10-CM

## 2019-03-26 PROCEDURE — 99283 EMERGENCY DEPT VISIT LOW MDM: CPT

## 2019-03-26 PROCEDURE — 73630 X-RAY EXAM OF FOOT: CPT

## 2019-03-26 RX ORDER — ACETAMINOPHEN 325 MG/1
975 TABLET ORAL ONCE
Status: COMPLETED | OUTPATIENT
Start: 2019-03-26 | End: 2019-03-26

## 2019-03-26 RX ORDER — IBUPROFEN 600 MG/1
600 TABLET ORAL ONCE
Status: COMPLETED | OUTPATIENT
Start: 2019-03-26 | End: 2019-03-26

## 2019-03-26 RX ORDER — NAPROXEN 500 MG/1
500 TABLET ORAL 2 TIMES DAILY WITH MEALS
Qty: 14 TABLET | Refills: 0 | Status: SHIPPED | OUTPATIENT
Start: 2019-03-26 | End: 2019-04-22

## 2019-03-26 RX ADMIN — ACETAMINOPHEN 975 MG: 325 TABLET ORAL at 23:24

## 2019-03-26 RX ADMIN — IBUPROFEN 600 MG: 600 TABLET ORAL at 23:24

## 2019-03-27 NOTE — DISCHARGE INSTRUCTIONS
Please follow up with Podiatry if symptoms continue got otherwise return to the emergency department if symptoms worsen

## 2019-03-27 NOTE — ED PROVIDER NOTES
History  Chief Complaint   Patient presents with    Toe Pain     Pt reports right great toe pain after she stubbed toe against wood  22-year-old healthy female presents for evaluation of right great toe pain after hitting it on a piece of wood after running from her mom  Injury occurred immediately prior to arrival   Patient did not fall, did not hit her head and did not lose consciousness  She felt immediate pain in her right great toe, which was throbbing, nonradiating  Otherwise able to ambulate into the emergency department without any assistance  She did not take any medicines prior to arrival but did smoke marijuana to try to help the pain  No similar injuries in the past           Prior to Admission Medications   Prescriptions Last Dose Informant Patient Reported? Taking?   ibuprofen (MOTRIN) 600 mg tablet   No No   Sig: Take 1 tablet (600 mg total) by mouth every 6 (six) hours as needed (pain)   prazosin (MINIPRESS) 2 mg capsule   Yes No   Sig: Take 2 mg by mouth daily at bedtime   traZODone (DESYREL) 50 mg tablet   Yes No   Sig: Take 50 mg by mouth daily at bedtime   valACYclovir (VALTREX) 500 mg tablet   Yes No   Sig: Take 500 mg by mouth daily      Facility-Administered Medications: None       History reviewed  No pertinent past medical history  History reviewed  No pertinent surgical history  History reviewed  No pertinent family history  I have reviewed and agree with the history as documented  Social History     Tobacco Use    Smoking status: Never Smoker    Smokeless tobacco: Never Used   Substance Use Topics    Alcohol use: No    Drug use: Yes     Types: Marijuana        Review of Systems   Constitutional: Negative for appetite change and fever  HENT: Negative for rhinorrhea and sore throat  Eyes: Negative for photophobia and visual disturbance  Respiratory: Negative for cough, chest tightness and wheezing      Cardiovascular: Negative for chest pain, palpitations and leg swelling  Gastrointestinal: Negative for abdominal distention, abdominal pain, blood in stool, constipation and diarrhea  Genitourinary: Negative for dysuria, flank pain, frequency, hematuria and urgency  Musculoskeletal: Negative for back pain  Skin: Positive for wound  Negative for rash  Neurological: Negative for dizziness, weakness and headaches  All other systems reviewed and are negative  Physical Exam  Physical Exam   Constitutional: She is oriented to person, place, and time  She appears well-developed and well-nourished  HENT:   Head: Normocephalic and atraumatic  Eyes: Pupils are equal, round, and reactive to light  EOM are normal    Neck: Normal range of motion  Neck supple  Cardiovascular: Normal rate and regular rhythm  Exam reveals no gallop and no friction rub  No murmur heard  Pulmonary/Chest: Effort normal  She has no wheezes  She has no rales  She exhibits no tenderness  Abdominal: Soft  She exhibits no distension and no mass  There is no rebound and no guarding  Musculoskeletal:   Tenderness to palpation over right great toe without any obvious deformity or swelling, neurovascularly intact   Neurological: She is alert and oriented to person, place, and time  Skin: Skin is warm and dry  Psychiatric: She has a normal mood and affect  Nursing note and vitals reviewed        Vital Signs  ED Triage Vitals [03/26/19 2303]   Temperature Pulse Respirations Blood Pressure SpO2   98 3 °F (36 8 °C) 87 16 99/50 97 %      Temp Source Heart Rate Source Patient Position - Orthostatic VS BP Location FiO2 (%)   Tympanic Monitor Sitting Left arm --      Pain Score       Worst Possible Pain           Vitals:    03/26/19 2303   BP: 99/50   Pulse: 87   Patient Position - Orthostatic VS: Sitting         Visual Acuity      ED Medications  Medications   acetaminophen (TYLENOL) tablet 975 mg (975 mg Oral Given 3/26/19 2324)   ibuprofen (MOTRIN) tablet 600 mg (600 mg Oral Given 3/26/19 0193)       Diagnostic Studies  Results Reviewed     None                 XR foot 3+ views RIGHT    (Results Pending)              Procedures  Procedures       Phone Contacts  ED Phone Contact    ED Course                               MDM  Number of Diagnoses or Management Options  Diagnosis management comments: 68-year-old female presents for evaluation of toe injury, will obtain x-ray, will treat symptomatically with Tylenol Motrin and ice pack and discharge with podiatry followup      Disposition  Final diagnoses:   Great toe pain, right     Time reflects when diagnosis was documented in both MDM as applicable and the Disposition within this note     Time User Action Codes Description Comment    3/26/2019 11:17 PM Nav Hines Add [G31 527] Great toe pain, right       ED Disposition     ED Disposition Condition Date/Time Comment    Discharge Stable Tue Mar 26, 2019 11:17 PM Elisabet Btut discharge to home/self care              Follow-up Information     Follow up With Specialties Details Why Contact Info    Leesa Oppenheim, MD Internal Medicine  As needed [address] [facility] Emergency Department Emergency Medicine  If symptoms worsen [email or web address] [id number]  Tompa U  2 , DPM Podiatry  As needed Henrry Perez [address] [place] [address] [address] [facility]  [phone]            Discharge Medication List as of 3/26/2019 11:18 PM      START taking these medications    Details   naproxen (NAPROSYN) 500 mg tablet Take 1 tablet (500 mg total) by mouth 2 (two) times a day with meals, Starting Tue 3/26/2019, Print         CONTINUE these medications which have NOT CHANGED    Details   ibuprofen (MOTRIN) 600 mg tablet Take 1 tablet (600 mg total) by mouth every 6 (six) hours as needed (pain), Starting Sun 10/21/2018, Print      prazosin (MINIPRESS) 2 mg capsule Take 2 mg by mouth daily at bedtime, Historical Med      traZODone (DESYREL) 50 mg tablet Take 50 mg by mouth daily at bedtime, Historical Med      valACYclovir (VALTREX) 500 mg tablet Take 500 mg by mouth daily, Starting Tue 3/13/2018, Historical Med           No discharge procedures on file      ED Provider  Electronically Signed by           Darci Myles MD  03/27/19 9354

## 2019-04-22 ENCOUNTER — HOSPITAL ENCOUNTER (EMERGENCY)
Facility: HOSPITAL | Age: 24
Discharge: HOME | End: 2019-04-22
Attending: EMERGENCY MEDICINE | Admitting: EMERGENCY MEDICINE
Payer: COMMERCIAL

## 2019-04-22 ENCOUNTER — APPOINTMENT (EMERGENCY)
Dept: RADIOLOGY | Facility: HOSPITAL | Age: 24
End: 2019-04-22
Payer: COMMERCIAL

## 2019-04-22 VITALS
DIASTOLIC BLOOD PRESSURE: 77 MMHG | OXYGEN SATURATION: 100 % | TEMPERATURE: 98.2 F | RESPIRATION RATE: 16 BRPM | WEIGHT: 110 LBS | HEART RATE: 68 BPM | SYSTOLIC BLOOD PRESSURE: 134 MMHG
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
## 2019-04-22 DIAGNOSIS — S62.109A WRIST FRACTURE, CLOSED: ICD-10-CM

## 2019-04-22 LAB — EXT PREG TEST URINE: NEGATIVE

## 2019-04-22 PROCEDURE — 73110 X-RAY EXAM OF WRIST: CPT

## 2019-04-22 PROCEDURE — 99283 EMERGENCY DEPT VISIT LOW MDM: CPT

## 2019-04-22 PROCEDURE — 71101 X-RAY EXAM UNILAT RIBS/CHEST: CPT

## 2019-04-22 PROCEDURE — 96372 THER/PROPH/DIAG INJ SC/IM: CPT

## 2019-04-22 PROCEDURE — 29125 APPL SHORT ARM SPLINT STATIC: CPT | Performed by: EMERGENCY MEDICINE

## 2019-04-22 PROCEDURE — 81025 URINE PREGNANCY TEST: CPT

## 2019-04-22 PROCEDURE — 73090 X-RAY EXAM OF FOREARM: CPT

## 2019-04-22 PROCEDURE — 99284 EMERGENCY DEPT VISIT MOD MDM: CPT | Performed by: EMERGENCY MEDICINE

## 2019-04-22 RX ORDER — OXYCODONE HYDROCHLORIDE AND ACETAMINOPHEN 5; 325 MG/1; MG/1
1 TABLET ORAL EVERY 4 HOURS PRN
Qty: 10 TABLET | Refills: 0 | Status: SHIPPED | OUTPATIENT
Start: 2019-04-22 | End: 2019-05-02

## 2019-04-22 RX ORDER — NAPROXEN 500 MG/1
500 TABLET ORAL 2 TIMES DAILY WITH MEALS
Qty: 30 TABLET | Refills: 0 | Status: SHIPPED | OUTPATIENT
Start: 2019-04-22 | End: 2019-12-09

## 2019-04-22 RX ORDER — KETOROLAC TROMETHAMINE 30 MG/ML
30 INJECTION, SOLUTION INTRAMUSCULAR; INTRAVENOUS ONCE
Status: COMPLETED | OUTPATIENT
Start: 2019-04-22 | End: 2019-04-22

## 2019-04-22 RX ADMIN — KETOROLAC TROMETHAMINE 30 MG: 30 INJECTION, SOLUTION INTRAMUSCULAR; INTRAVENOUS at 15:20

## 2019-04-25 ENCOUNTER — OFFICE VISIT (OUTPATIENT)
Dept: OBGYN CLINIC | Facility: CLINIC | Age: 24
End: 2019-04-25
Payer: COMMERCIAL

## 2019-04-25 VITALS
DIASTOLIC BLOOD PRESSURE: 76 MMHG | HEART RATE: 80 BPM | WEIGHT: 110 LBS | SYSTOLIC BLOOD PRESSURE: 114 MMHG | HEIGHT: 62 IN | BODY MASS INDEX: 20.24 KG/M2

## 2019-04-25 DIAGNOSIS — S52.501A CLOSED FRACTURE OF DISTAL END OF RIGHT RADIUS, UNSPECIFIED FRACTURE MORPHOLOGY, INITIAL ENCOUNTER: Primary | ICD-10-CM

## 2019-04-25 PROCEDURE — 99203 OFFICE O/P NEW LOW 30 MIN: CPT | Performed by: ORTHOPAEDIC SURGERY

## 2019-04-25 PROCEDURE — 25600 CLTX DST RDL FX/EPHYS SEP WO: CPT | Performed by: ORTHOPAEDIC SURGERY

## 2019-04-29 ENCOUNTER — TELEPHONE (OUTPATIENT)
Dept: OBGYN CLINIC | Facility: HOSPITAL | Age: 24
End: 2019-04-29

## 2019-04-29 ENCOUNTER — OFFICE VISIT (OUTPATIENT)
Dept: OBGYN CLINIC | Facility: OTHER | Age: 24
End: 2019-04-29
Payer: COMMERCIAL

## 2019-04-29 VITALS
SYSTOLIC BLOOD PRESSURE: 103 MMHG | HEIGHT: 62 IN | WEIGHT: 110 LBS | HEART RATE: 83 BPM | BODY MASS INDEX: 20.24 KG/M2 | DIASTOLIC BLOOD PRESSURE: 70 MMHG

## 2019-04-29 DIAGNOSIS — S52.501D CLOSED FRACTURE OF DISTAL END OF RIGHT RADIUS WITH ROUTINE HEALING, UNSPECIFIED FRACTURE MORPHOLOGY, SUBSEQUENT ENCOUNTER: Primary | ICD-10-CM

## 2019-04-29 PROCEDURE — 99024 POSTOP FOLLOW-UP VISIT: CPT | Performed by: ORTHOPAEDIC SURGERY

## 2019-04-29 PROCEDURE — 29075 APPL CST ELBW FNGR SHORT ARM: CPT | Performed by: ORTHOPAEDIC SURGERY

## 2019-05-07 ENCOUNTER — HOSPITAL ENCOUNTER (OUTPATIENT)
Dept: RADIOLOGY | Facility: HOSPITAL | Age: 24
Discharge: HOME | End: 2019-05-07
Attending: ORTHOPAEDIC SURGERY
Payer: COMMERCIAL

## 2019-05-07 ENCOUNTER — OFFICE VISIT (OUTPATIENT)
Dept: OBGYN CLINIC | Facility: CLINIC | Age: 24
End: 2019-05-07

## 2019-05-07 DIAGNOSIS — S52.501D CLOSED FRACTURE OF DISTAL END OF RIGHT RADIUS WITH ROUTINE HEALING, UNSPECIFIED FRACTURE MORPHOLOGY, SUBSEQUENT ENCOUNTER: Primary | ICD-10-CM

## 2019-05-07 DIAGNOSIS — S52.501D CLOSED FRACTURE OF DISTAL END OF RIGHT RADIUS WITH ROUTINE HEALING, UNSPECIFIED FRACTURE MORPHOLOGY, SUBSEQUENT ENCOUNTER: ICD-10-CM

## 2019-05-07 PROCEDURE — 73110 X-RAY EXAM OF WRIST: CPT

## 2019-05-07 PROCEDURE — 99024 POSTOP FOLLOW-UP VISIT: CPT | Performed by: ORTHOPAEDIC SURGERY

## 2019-06-10 ENCOUNTER — TELEPHONE (OUTPATIENT)
Dept: OBGYN CLINIC | Facility: CLINIC | Age: 24
End: 2019-06-10

## 2019-06-10 NOTE — TELEPHONE ENCOUNTER
"OB/GYN  Post-Partum Progress Note  Hospital Location:  [facility]    Patient: Natacha Rivera Date: 2017   : 1995 Attending: Minnie Salinas MD   23year old female Admit Date: 2017    Allergies:   ALLERGIES:  No Known Allergies  Immunizations:    Most Recent Immunizations   Administered Date(s) Administered   â¢ DTaP (INFANRIX) 2003   â¢ Depo-provera 2015   â¢ Depo-provera Subq   2013   â¢ HIB, Unspecified Formulation 1999   â¢ HPV 9-Valent 2016   â¢ HPV Quadrivalent 2014   â¢ Hep B, adolescent or pediatric 2000   â¢ Influenza, injectable, quadrivalent, preservative-free 10/03/2017   â¢ MMR 2003   â¢ Meningococcal Conjugate MCV4P (Menactra) 03/10/2014   â¢ Meningococcus 2008   â¢ Polio, Oral 2003   â¢ Tdap 2017   â¢ Varicella 2008   Deferred Date(s) Deferred   â¢ MMR 2017   â¢ Tdap 2017   â¢ Varicella 2017     Vitals:  Visit Vitals  /66 (BP Location: [facility], Patient Position: Supine)   Pulse 85   Temp 99.5 Â°F (37.5 Â°C) (Oral)   Resp 16   Ht 5' 2"" (1.575 m)   Wt 66.8 kg   LMP 2016   SpO2 98%   Breastfeeding? No   BMI 26.92 kg/mÂ²     Vital Last Value 24 Hour Range   Temperature 99.5 Â°F (37.5 Â°C) Temp  Min: 97.6 Â°F (36.4 Â°C)  Max: 99.5 Â°F (37.5 Â°C)   Pulse 85 Pulse  Min: 51  Max: 111   Respiratory 16 Resp  Min: 16  Max: 18   Blood Pressure 110/66 BP  Min: 110/66  Max: 165/77   Pulse Oximetry 98 % SpO2  Min: 77 %  Max: 100 %   CVP   No Data Recorded     Vital Today Admit   Weight 66.8 kg Weight: 66.8 kg   Height N/A Height: 5' 2\"" (157.5 cm)   BMI N/A BMI (Calculated): 26.98     Intake/Output:  Last Stool Occurrence:    No intake/output data recorded. I/O last 3 completed shifts:   In: 1000 [I.V.:1000]  Out: 652 [Urine:450; Emesis:2; Blood:200]    Medications/Infusions:  Scheduled:   â¢ cholecalciferol  1,000 Units Oral Daily   â¢ multivitamin & mineral w/folic acid- PRENATAL  1 tablet Oral Daily   â¢ " Dr Carmen Gross could not get a ride to her appt today because it is too far  Her right wrist fx was casted on 4/22/19 but she got it wet and she removed it  Your next available appt is on the 25th; is that ok? Best contact #[phone]    Thank you measles-mumps-rubella  0.5 mL Subcutaneous Once   â¢ varicella virus  0.5 mL Subcutaneous Once   â¢ diphtheria-pertussis (acellular) tetanus vaccine  0.5 mL Intramuscular Once   â¢ multivitamin & mineral w/folic acid- PRENATAL  1 tablet Oral Daily   â¢ sodium chloride (PF)  2 mL Injection 2 times per day     HISTORY   Aidan Queen is [id number] with a history of:    Active Hospital Problems    Diagnosis Date Noted   â¢ Encounter for supervision of normal first pregnancy in third trimester 10/24/2017     Priority: Low     Past Medical History:   Diagnosis Date   â¢ Bronchitis    â¢ Lump or mass in breast 3/10/2014   â¢ RAD (reactive airway disease)    â¢ STD (sexually transmitted disease)     Chlamydia     Presented to [facility] for contractions and is now postpartum from:    Vaginal, Spontaneous Delivery [250]   None . EBL: 200  ml. Information for the patient's :  Cinthia Krabbe [[id number]]   male  . reports that she has never smoked. She has never used smokeless tobacco.. REVIEW OF SYSTEMS   Subjective:    She is bottle feeding. She would like to go home today. Patient reports:Doing well without complaints. Pain Controlled Normal Bleeding. Tolerating Diet.   (+) Flatus. Voiding without difficulty. Abdominal Cramping. Patient denies:  Nausea. Vomiting. Increased Vaginal Bleeding. Fever/chills. Chest Pain. Shortness of Breath. Increased Lower Extremity Edema. Lower Extremity Pain. Increased Vaginal Pain. Dizziness. Headache. Blurry Vision. PHYSICAL EXAM   Uterine fundus firm, non-tender. Midline.   Extremities: Non-tender, 1+ bilateral lower extremity edema  Pulses:  2+ and symmetric  Laboratory Data:      Recent Labs  Lab 17  0524 17  0930   WBC  --  8.9   HCT 31.5* 34.6*   HGB 10.8* 12.0   PLT  --  157     ABO/RH(D)   Date Value Ref Range Status   04/10/2017 A POSITIVE  Final     CULTURE   Date Value Ref Range Status   10/17/2017   Final    NEGATIVE FOR STREPTOCOCCUS AGALACTIAE (STREP GROUP B)     ASSESSMENT/PLAN   Pertinent Reviewed: Allergies, Medications, EPIC Data, Op Note, Physician Notes  Delivery:   @ 40w5d 2017 @ 1953 PP Day:   1    Â· Post-Partum:    Â· Routine postpartum care  Â· VSS currently  Â· Overnight episodes of elevated blood pressure (138-165 / 67-93)  Â· Most current b/p 110/66  Â· Continue to monitor  Â· Reports doing well, pain controlled  Â· Tolerating diet, denies N/V  Â· Reports (+) flatus. Â· (+) void  Â· H/H Post-Partum:  10.8/31.5  Â· Acute blood loss anemia  Â· Denies s/s hypovolemia  Â· No further interventions at this time  Â· Blood Type:    Â· A POSITIVE  Â· GBS   Â· Negative  Â· [place]:    Â· Male  Â· bottle feeding  Â· Desires circumcision. Anticipate circumcision to be completed by pediatrician office, please notify OB/GYN if needed  Â· PPBC:    Â· Depo, would like to receive prior to d/c, orders placed  Â· Discussed pelvic rest x 6 weeks post-partum  Â· Disposition:  Â· Progressing appropriately  Â· Vaccines prior to d/c as appropriate  Â· Preferred pharmacy updated in discharge planning  Â· Plan for discharge:  Desires d/c home today if possible.     Â· May be d/c home today if  is d/c  Â· Further disposition per Dr. Caitlin Cordero, DNP, ANP-BC, APNP  2017  Dr. Doyle Michael for co-signature    Treatment Team: Attending Provider: Doyle Michael MD

## 2019-06-10 NOTE — TELEPHONE ENCOUNTER
Spoke with gino she will be scheduled for tomorrow to come in with dr Lorraine Moss is [address] [place] [address]

## 2019-06-11 ENCOUNTER — HOSPITAL ENCOUNTER (OUTPATIENT)
Dept: RADIOLOGY | Facility: HOSPITAL | Age: 24
Discharge: HOME | End: 2019-06-11
Attending: ORTHOPAEDIC SURGERY
Payer: COMMERCIAL

## 2019-06-11 ENCOUNTER — OFFICE VISIT (OUTPATIENT)
Dept: OBGYN CLINIC | Facility: CLINIC | Age: 24
End: 2019-06-11

## 2019-06-11 VITALS — HEIGHT: 62 IN | WEIGHT: 110 LBS | BODY MASS INDEX: 20.24 KG/M2

## 2019-06-11 DIAGNOSIS — S52.501D CLOSED FRACTURE OF DISTAL END OF RIGHT RADIUS WITH ROUTINE HEALING, UNSPECIFIED FRACTURE MORPHOLOGY, SUBSEQUENT ENCOUNTER: Primary | ICD-10-CM

## 2019-06-11 DIAGNOSIS — S52.501D CLOSED FRACTURE OF DISTAL END OF RIGHT RADIUS WITH ROUTINE HEALING, UNSPECIFIED FRACTURE MORPHOLOGY, SUBSEQUENT ENCOUNTER: ICD-10-CM

## 2019-06-11 PROCEDURE — 99024 POSTOP FOLLOW-UP VISIT: CPT | Performed by: ORTHOPAEDIC SURGERY

## 2019-06-11 PROCEDURE — 73110 X-RAY EXAM OF WRIST: CPT

## 2019-10-09 ENCOUNTER — HOSPITAL ENCOUNTER (EMERGENCY)
Facility: HOSPITAL | Age: 24
Discharge: HOME | End: 2019-10-09
Attending: EMERGENCY MEDICINE
Payer: COMMERCIAL

## 2019-10-09 VITALS
DIASTOLIC BLOOD PRESSURE: 70 MMHG | BODY MASS INDEX: 18.95 KG/M2 | HEART RATE: 66 BPM | OXYGEN SATURATION: 100 % | RESPIRATION RATE: 18 BRPM | TEMPERATURE: 98.1 F | WEIGHT: 103.62 LBS | SYSTOLIC BLOOD PRESSURE: 120 MMHG

## 2019-10-09 DIAGNOSIS — J06.9 VIRAL UPPER RESPIRATORY TRACT INFECTION: Primary | ICD-10-CM

## 2019-10-09 PROCEDURE — 99282 EMERGENCY DEPT VISIT SF MDM: CPT | Performed by: PHYSICIAN ASSISTANT

## 2019-10-09 PROCEDURE — 99283 EMERGENCY DEPT VISIT LOW MDM: CPT

## 2019-10-09 RX ORDER — FLUTICASONE PROPIONATE 50 MCG
1 SPRAY, SUSPENSION (ML) NASAL DAILY
Qty: 16 G | Refills: 0 | Status: SHIPPED | OUTPATIENT
Start: 2019-10-09 | End: 2019-11-22 | Stop reason: ALTCHOICE

## 2019-10-09 RX ORDER — OXYMETAZOLINE HYDROCHLORIDE 0.05 G/100ML
2 SPRAY NASAL 2 TIMES DAILY
Qty: 1.2 ML | Refills: 0 | Status: SHIPPED | OUTPATIENT
Start: 2019-10-09 | End: 2019-11-22 | Stop reason: ALTCHOICE

## 2019-10-09 NOTE — ED PROVIDER NOTES
History  Chief Complaint   Patient presents with    Cold Like Symptoms     pt c/o cough, congestion and sore throat ongoing for 5 days    Sore Throat     68-year-old female with history of anxiety as, presents emergency department for 5 days of cough, congestion, sore throat  Patient states the congestion started first, followed by a productive cough, which she describes as yellow sputum     Patient states her girlfriend is also sick with similar symptoms  She denies any fever but states she felt subjectively warm yesterday  She said she took Tylenol and Motrin in yesterday, without relief  Patient sources some nausea, but she states she thinks it is related to swallowing mucus  She states she has had decreased appetite, but is drinking normally and urinating as usual   She denies any chest pain, shortness of breath, abdominal pain, vomiting, body aches, headache, wheezing  Cough   Cough characteristics:  Productive  Sputum characteristics:  Yellow  Severity:  Mild  Onset quality:  Gradual  Duration:  5 days  Progression:  Unchanged  Smoker: yes    Context: sick contacts, upper respiratory infection and weather changes    Relieved by:  Nothing  Worsened by:  Nothing  Associated symptoms: rhinorrhea, sinus congestion and sore throat    Associated symptoms: no chest pain, no chills, no ear pain, no fever, no headaches, no myalgias, no rash, no shortness of breath and no wheezing        Prior to Admission Medications   Prescriptions Last Dose Informant Patient Reported?  Taking?   naproxen (NAPROSYN) 500 mg tablet   No No   Sig: Take 1 tablet (500 mg total) by mouth 2 (two) times a day with meals   Patient not taking: Reported on 6/11/2019   prazosin (MINIPRESS) 2 mg capsule   Yes No   Sig: Take 2 mg by mouth daily at bedtime   traZODone (DESYREL) 50 mg tablet   Yes No   Sig: Take 50 mg by mouth daily at bedtime   valACYclovir (VALTREX) 500 mg tablet   Yes No   Sig: Take 500 mg by mouth daily Facility-Administered Medications: None       Past Medical History:   Diagnosis Date    Anxiety     Psychiatric disorder        History reviewed  No pertinent surgical history  History reviewed  No pertinent family history  I have reviewed and agree with the history as documented  Social History     Tobacco Use    Smoking status: Never Smoker    Smokeless tobacco: Never Used   Substance Use Topics    Alcohol use: Yes     Comment: socially    Drug use: Yes     Types: Marijuana     Comment: daily        Review of Systems   Constitutional: Negative for chills and fever  HENT: Positive for rhinorrhea and sore throat  Negative for congestion and ear pain  Respiratory: Positive for cough  Negative for shortness of breath and wheezing  Cardiovascular: Negative for chest pain  Gastrointestinal: Negative for abdominal pain, diarrhea, nausea and vomiting  Genitourinary: Negative for dysuria, frequency and urgency  Musculoskeletal: Negative for myalgias  Skin: Negative for rash  Neurological: Negative for weakness and headaches  All other systems reviewed and are negative  Physical Exam  Physical Exam   Constitutional: She is oriented to person, place, and time  Vital signs are normal  She appears well-developed and well-nourished  Non-toxic appearance  She appears ill  No distress  HENT:   Head: Normocephalic and atraumatic  Right Ear: Hearing, tympanic membrane, external ear and ear canal normal    Left Ear: Hearing, tympanic membrane, external ear and ear canal normal    Nose: Right sinus exhibits maxillary sinus tenderness  Left sinus exhibits maxillary sinus tenderness  Mouth/Throat: Uvula is midline, oropharynx is clear and moist and mucous membranes are normal  No posterior oropharyngeal edema or posterior oropharyngeal erythema  Tonsils are 2+ on the right  Tonsils are 2+ on the left  No tonsillar exudate     Uvula is midline with symmetric rise the palate, mild erythema in the posterior oropharynx, without exudates  Eyes: Conjunctivae are normal    Neck: Normal range of motion and full passive range of motion without pain  No neck rigidity  Cardiovascular: Normal rate, regular rhythm, S1 normal, S2 normal and normal heart sounds  No murmur heard  Pulmonary/Chest: Effort normal and breath sounds normal  No accessory muscle usage  No respiratory distress  She has no wheezes  Abdominal: Soft  There is no tenderness  There is no rebound and no guarding  Musculoskeletal: Normal range of motion  Gait is coordinated and smooth   Lymphadenopathy:     She has cervical adenopathy  Neurological: She is alert and oriented to person, place, and time  Skin: Skin is warm and dry  Capillary refill takes less than 2 seconds  No rash noted  Psychiatric: She has a normal mood and affect  Her behavior is normal  Judgment and thought content normal  Cognition and memory are normal    Nursing note and vitals reviewed  Vital Signs  ED Triage Vitals [10/09/19 1248]   Temperature Pulse Respirations Blood Pressure SpO2   98 1 °F (36 7 °C) 66 18 120/70 100 %      Temp Source Heart Rate Source Patient Position - Orthostatic VS BP Location FiO2 (%)   Oral Monitor -- -- --      Pain Score       4           Vitals:    10/09/19 1248   BP: 120/70   Pulse: 66         Visual Acuity      ED Medications  Medications - No data to display    Diagnostic Studies  Results Reviewed     None                 No orders to display              Procedures  Procedures       ED Course                               MDM  Number of Diagnoses or Management Options  Viral upper respiratory tract infection:   Diagnosis management comments: 27-year-old female presents with cough, sore throat, congestion x5 days  Counseled patient that this is a viral upper respiratory infection, and provided prescriptions for Flonase and Afrin, for symptomatic relief  Encouraged rest and hydration    Provided a note for work   Patient was apprised of red flag symptoms and return to emergency department precautions  Patient verbalized understanding and all questions were answered  Disposition  Final diagnoses:   Viral upper respiratory tract infection     Time reflects when diagnosis was documented in both MDM as applicable and the Disposition within this note     Time User Action Codes Description Comment    10/9/2019  1:22 PM Asher Fischer Add [J06 9] Viral upper respiratory tract infection       ED Disposition     ED Disposition Condition Date/Time Comment    Discharge Stable Wed Oct 9, 2019  1:22 PM Aric Camejo discharge to home/self care  Follow-up Information     Follow up With Specialties Details Why Contact Info    Suri Bright MD Family Medicine Schedule an appointment as soon as possible for a visit   [address]  [phone]            Discharge Medication List as of 10/9/2019  1:23 PM      START taking these medications    Details   fluticasone (FLONASE) 50 mcg/act nasal spray 1 spray into each nostril daily for 7 days, Starting Wed 10/9/2019, Until Wed 10/16/2019, Print      oxymetazoline (AFRIN) 0 05 % nasal spray 2 sprays by Each Nare route 2 (two) times a day for 3 days, Starting Wed 10/9/2019, Until Sat 10/12/2019, Print         CONTINUE these medications which have NOT CHANGED    Details   naproxen (NAPROSYN) 500 mg tablet Take 1 tablet (500 mg total) by mouth 2 (two) times a day with meals, Starting Mon 4/22/2019, Print      prazosin (MINIPRESS) 2 mg capsule Take 2 mg by mouth daily at bedtime, Historical Med      traZODone (DESYREL) 50 mg tablet Take 50 mg by mouth daily at bedtime, Historical Med      valACYclovir (VALTREX) 500 mg tablet Take 500 mg by mouth daily, Starting Tue 3/13/2018, Historical Med           No discharge procedures on file      ED Provider  Electronically Signed by           Serjio Ny PA-C  10/09/19 3890

## 2019-11-21 ENCOUNTER — HOSPITAL ENCOUNTER (EMERGENCY)
Facility: HOSPITAL | Age: 24
Discharge: HOME | End: 2019-11-22
Attending: EMERGENCY MEDICINE

## 2019-11-21 VITALS
OXYGEN SATURATION: 100 % | SYSTOLIC BLOOD PRESSURE: 103 MMHG | DIASTOLIC BLOOD PRESSURE: 58 MMHG | HEART RATE: 62 BPM | BODY MASS INDEX: 18.43 KG/M2 | WEIGHT: 100.75 LBS | RESPIRATION RATE: 17 BRPM

## 2019-11-21 DIAGNOSIS — M54.2 NECK PAIN: Primary | ICD-10-CM

## 2019-11-21 DIAGNOSIS — R07.89 CHEST WALL PAIN: ICD-10-CM

## 2019-11-21 DIAGNOSIS — M25.519 SHOULDER PAIN: ICD-10-CM

## 2019-11-21 DIAGNOSIS — M54.9 BACK PAIN: ICD-10-CM

## 2019-11-21 PROCEDURE — 99284 EMERGENCY DEPT VISIT MOD MDM: CPT | Performed by: EMERGENCY MEDICINE

## 2019-11-21 PROCEDURE — 99285 EMERGENCY DEPT VISIT HI MDM: CPT

## 2019-11-21 PROCEDURE — 93005 ELECTROCARDIOGRAM TRACING: CPT

## 2019-11-22 ENCOUNTER — APPOINTMENT (EMERGENCY)
Dept: RADIOLOGY | Facility: HOSPITAL | Age: 24
End: 2019-11-22

## 2019-11-22 LAB
ATRIAL RATE: 59 BPM
P AXIS: 54 DEGREES
PR INTERVAL: 174 MS
QRS AXIS: 90 DEGREES
QRSD INTERVAL: 66 MS
QT INTERVAL: 414 MS
QTC INTERVAL: 409 MS
T WAVE AXIS: 89 DEGREES
VENTRICULAR RATE: 59 BPM

## 2019-11-22 PROCEDURE — 93010 ELECTROCARDIOGRAM REPORT: CPT | Performed by: INTERNAL MEDICINE

## 2019-11-22 PROCEDURE — 71046 X-RAY EXAM CHEST 2 VIEWS: CPT

## 2019-11-22 RX ORDER — NAPROXEN 500 MG/1
500 TABLET ORAL 2 TIMES DAILY WITH MEALS
Qty: 30 TABLET | Refills: 0 | Status: SHIPPED | OUTPATIENT
Start: 2019-11-22 | End: 2019-12-09

## 2019-11-22 RX ORDER — LIDOCAINE 50 MG/G
1 PATCH TOPICAL ONCE
Status: DISCONTINUED | OUTPATIENT
Start: 2019-11-22 | End: 2019-11-22 | Stop reason: HOSPADM

## 2019-11-22 RX ORDER — METHOCARBAMOL 500 MG/1
500 TABLET, FILM COATED ORAL 2 TIMES DAILY
Qty: 20 TABLET | Refills: 0 | Status: SHIPPED | OUTPATIENT
Start: 2019-11-22 | End: 2019-12-09

## 2019-11-22 RX ORDER — METHOCARBAMOL 500 MG/1
500 TABLET, FILM COATED ORAL ONCE
Status: COMPLETED | OUTPATIENT
Start: 2019-11-22 | End: 2019-11-22

## 2019-11-22 RX ORDER — LIDOCAINE 50 MG/G
1 PATCH TOPICAL DAILY
Qty: 30 PATCH | Refills: 0 | Status: SHIPPED | OUTPATIENT
Start: 2019-11-22 | End: 2019-12-09

## 2019-11-22 RX ADMIN — METHOCARBAMOL TABLETS 500 MG: 500 TABLET, COATED ORAL at 00:23

## 2019-11-22 RX ADMIN — DICLOFENAC 2 G: 10 GEL TOPICAL at 01:21

## 2019-11-22 RX ADMIN — LIDOCAINE 1 PATCH: 50 PATCH TOPICAL at 00:24

## 2019-11-22 NOTE — ED PROVIDER NOTES
History  Chief Complaint   Patient presents with    Chest Pain     pt reports reproducable chest pain that started at work today requiring her to leave, reports the pain starts in middle of chest and radiates to shoulder and back     Pt is a 27year old female with a PMH of anxiety, depression, Bipolar disorder presenting with neck, back, shoulder and chest pain x 1 day  Pt states she woke up with the pain which has been severe and constant  States she has midline neck pain that radiates to her right upper back, shoulder and anterior chest  Denies injury or neck stiffness  Denies fevers, lightheadedness, dizziness  Associated SOB due to pain  Has been taking Motrin and Tylenol without relief  No erythema, swelling, ecchymosis  No family history of MI  Prior to Admission Medications   Prescriptions Last Dose Informant Patient Reported? Taking?   naproxen (NAPROSYN) 500 mg tablet Not Taking at Unknown time  No No   Sig: Take 1 tablet (500 mg total) by mouth 2 (two) times a day with meals   Patient not taking: Reported on 6/11/2019   prazosin (MINIPRESS) 2 mg capsule   Yes Yes   Sig: Take 2 mg by mouth daily at bedtime   traZODone (DESYREL) 50 mg tablet   Yes Yes   Sig: Take 50 mg by mouth daily at bedtime   valACYclovir (VALTREX) 500 mg tablet   Yes Yes   Sig: Take 500 mg by mouth daily      Facility-Administered Medications: None       Past Medical History:   Diagnosis Date    Anxiety     Psychiatric disorder        History reviewed  No pertinent surgical history  History reviewed  No pertinent family history  I have reviewed and agree with the history as documented  Social History     Tobacco Use    Smoking status: Never Smoker    Smokeless tobacco: Never Used   Substance Use Topics    Alcohol use: Yes     Comment: socially    Drug use: Yes     Types: Marijuana     Comment: daily        Review of Systems   Constitutional: Negative for chills, diaphoresis and fever     Respiratory: Positive for shortness of breath  Negative for cough and chest tightness  Cardiovascular: Positive for chest pain  Gastrointestinal: Negative for abdominal pain, constipation, diarrhea, nausea and vomiting  Genitourinary: Negative for decreased urine volume and difficulty urinating  Musculoskeletal: Positive for arthralgias, back pain and neck pain  Negative for joint swelling and neck stiffness  Neurological: Negative for dizziness and light-headedness  Physical Exam  Physical Exam   Constitutional: She is oriented to person, place, and time  She appears well-developed and well-nourished  She appears distressed  HENT:   Head: Normocephalic and atraumatic  Right Ear: External ear normal    Left Ear: External ear normal    Nose: Nose normal    Eyes: Conjunctivae and EOM are normal    Neck: Normal range of motion  Neck supple  Spinous process tenderness and muscular tenderness present  No neck rigidity  No edema, no erythema and normal range of motion present  Cardiovascular: Normal rate, regular rhythm, normal heart sounds and intact distal pulses  Pulses:       Radial pulses are 2+ on the right side, and 2+ on the left side  Pulmonary/Chest: Effort normal and breath sounds normal    Musculoskeletal:        Right shoulder: She exhibits decreased range of motion, tenderness, pain and decreased strength  She exhibits no bony tenderness, no swelling, no effusion, no crepitus, no deformity, no laceration, no spasm and normal pulse  Right elbow: Normal        Cervical back: She exhibits tenderness, bony tenderness and pain  She exhibits normal range of motion, no swelling, no edema, no deformity, no laceration, no spasm and normal pulse  TTP of cervical spine, right proximal trapezius area, right shoulder and anterior chest wall without erythema, swelling, ecchymosis  Decreased ROM and strength of the right shoulder compared to left  Neurovascularly in tact distally      Neurological: She is alert and oriented to person, place, and time  No sensory deficit  Skin: Skin is warm and dry  She is not diaphoretic  Vital Signs  ED Triage Vitals   Temp Pulse Respirations Blood Pressure SpO2   -- 11/21/19 2358 11/21/19 2355 11/21/19 2358 11/21/19 2358    62 17 103/58 100 %      Temp src Heart Rate Source Patient Position - Orthostatic VS BP Location FiO2 (%)   -- -- -- -- --             Pain Score       11/21/19 2357       Worst Possible Pain           Vitals:    11/21/19 2358   BP: 103/58   Pulse: 62         Visual Acuity      ED Medications  Medications   methocarbamol (ROBAXIN) tablet 500 mg (500 mg Oral Given 11/22/19 0023)       Diagnostic Studies  Results Reviewed     None                 XR chest 2 views   ED Interpretation by Negrito Quinones PA-C (11/22 0109)   No acute cardiopulmonary disease                 Procedures  ECG 12 Lead Documentation Only  Date/Time: 11/22/2019 12:07 AM  Performed by: Negrito Quinones PA-C  Authorized by: Negrito Quinones PA-C     ECG reviewed by me, the ED Provider: yes    Patient location:  ED  Previous ECG:     Previous ECG:  Unavailable    Comparison to cardiac monitor: No    Interpretation:     Interpretation: normal    Rate:     ECG rate:  59    ECG rate assessment: bradycardic    Rhythm:     Rhythm: sinus bradycardia    Ectopy:     Ectopy: none    QRS:     QRS axis:  Normal    QRS intervals:  Normal  Conduction:     Conduction: normal    ST segments:     ST segments:  Normal  T waves:     T waves: normal             ED Course                               MDM  Number of Diagnoses or Management Options  Back pain:   Chest wall pain:   Neck pain:   Shoulder pain:   Diagnosis management comments: Non-concerning chest pain, as her pain is reproducible and she has other musculoskeletal pain associated  She has midline neck pain and radiation to the right shoulder  Consulted comprehensive spine program for pt  Supportive care   EKG and CXR normal  Follow up with providers given  Educated on return precautions  Disposition  Final diagnoses:   Neck pain   Back pain   Shoulder pain   Chest wall pain     Time reflects when diagnosis was documented in both MDM as applicable and the Disposition within this note     Time User Action Codes Description Comment    11/22/2019 12:18 AM aLurie TARIQ Add [M54 2] Neck pain     11/22/2019 12:18 AM Laurie TARIQ Modify [M54 2] Neck pain     11/22/2019  1:10 AM Laurie Licea B Add [M54 9] Back pain     11/22/2019  1:10 AM Laurie Licea B Add [M25 519] Shoulder pain     11/22/2019  1:10 AM Luis Eduardo Conley Add [R07 89] Chest wall pain       ED Disposition     ED Disposition Condition Date/Time Comment    Discharge Good Fri Nov 22, 2019  1:11 AM Deshawn Fox discharge to home/self care  Follow-up Information     Follow up With Specialties Details Why Contact Info    Octavio Hendricks MD Family Medicine Schedule an appointment as soon as possible for a visit today  [address] [place] [id number]            Discharge Medication List as of 11/22/2019  1:11 AM      START taking these medications    Details   lidocaine (LIDODERM) 5 % Apply 1 patch topically daily Remove & Discard patch within 12 hours or as directed by MD, Starting Fri 11/22/2019, Print      methocarbamol (ROBAXIN) 500 mg tablet Take 1 tablet (500 mg total) by mouth 2 (two) times a day, Starting Fri 11/22/2019, Print      !! naproxen (NAPROSYN) 500 mg tablet Take 1 tablet (500 mg total) by mouth 2 (two) times a day with meals, Starting Fri 11/22/2019, Print       !! - Potential duplicate medications found  Please discuss with provider        CONTINUE these medications which have NOT CHANGED    Details   prazosin (MINIPRESS) 2 mg capsule Take 2 mg by mouth daily at bedtime, Historical Med      traZODone (DESYREL) 50 mg tablet Take 50 mg by mouth daily at bedtime, Historical Med      valACYclovir (VALTREX) 500 mg tablet Take 500 mg by mouth daily, Starting Tue 3/13/2018, Historical Med      !! naproxen (NAPROSYN) 500 mg tablet Take 1 tablet (500 mg total) by mouth 2 (two) times a day with meals, Starting Mon 4/22/2019, Print       !! - Potential duplicate medications found  Please discuss with provider              ED Provider  Electronically Signed by           Naya Florez PA-C  11/22/19 4585

## 2019-12-02 ENCOUNTER — TELEPHONE (OUTPATIENT)
Dept: OBGYN CLINIC | Facility: HOSPITAL | Age: 24
End: 2019-12-02

## 2019-12-02 NOTE — TELEPHONE ENCOUNTER
Returned patients vm to schedule fup with Dr Karma Tierney      Contacted patient and scheduled FUP with Dr Karma Tierney for 12/10 at 8:45AM

## 2019-12-09 ENCOUNTER — OFFICE VISIT (OUTPATIENT)
Dept: FAMILY MEDICINE CLINIC | Facility: CLINIC | Age: 24
End: 2019-12-09

## 2019-12-09 VITALS
SYSTOLIC BLOOD PRESSURE: 102 MMHG | WEIGHT: 102 LBS | BODY MASS INDEX: 17.42 KG/M2 | OXYGEN SATURATION: 98 % | HEART RATE: 67 BPM | DIASTOLIC BLOOD PRESSURE: 60 MMHG | HEIGHT: 64 IN | TEMPERATURE: 97.5 F

## 2019-12-09 DIAGNOSIS — Z00.00 ENCOUNTER FOR MEDICAL EXAMINATION TO ESTABLISH CARE: Primary | ICD-10-CM

## 2019-12-09 PROBLEM — Q51.810 ARCUATE UTERUS: Status: ACTIVE | Noted: 2017-12-12

## 2019-12-09 PROBLEM — E22.1 HYPERPROLACTINEMIA (HCC): Status: ACTIVE | Noted: 2018-02-23

## 2019-12-09 PROCEDURE — 99395 PREV VISIT EST AGE 18-39: CPT | Performed by: SPECIALIST

## 2019-12-09 NOTE — PATIENT INSTRUCTIONS
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

## 2019-12-09 NOTE — PROGRESS NOTES
Assessment/Plan:    Pt  Seen  For  Drivers   License   And  Annual  Physical  Examination       Formed  Filled  Out       Sees  Counselor  And   Psychiatry    For  ptsd   And    Depression  Anxiety   On  Treatment       Patient seen in office today  During the visit I was accompanied by MA while physical exam was completed  No problem-specific Assessment & Plan notes found for this encounter  Problem List Items Addressed This Visit     None      Visit Diagnoses     Encounter for medical examination to establish care    -  Primary            Subjective:            Patient ID: Allen Kramer is a 27 y o  female  26-year-old female for 's license physical patient appears very calm and cooperative     she has been seen by counselor and schedule see Psychiatry she is on treatment for  PTSD and depression anxiety     medications are reviewed          The following portions of the patient's history were reviewed and updated as appropriate: allergies, past family history, past social history and past surgical history  Review of Systems   Constitutional: Negative for activity change, appetite change, chills, diaphoresis, fatigue and fever  HENT: Negative for congestion, sinus pressure, sinus pain and voice change  Eyes: Negative for visual disturbance  Respiratory: Negative for cough, shortness of breath and wheezing  Cardiovascular: Negative for chest pain, palpitations and leg swelling  Gastrointestinal: Negative for abdominal distention and abdominal pain  Genitourinary: Negative for difficulty urinating  Musculoskeletal: Negative for arthralgias, back pain, gait problem, joint swelling, myalgias, neck pain and neck stiffness  Skin: Negative  Neurological: Negative for dizziness, seizures, syncope, facial asymmetry, speech difficulty, weakness, light-headedness, numbness and headaches  Psychiatric/Behavioral: Negative for agitation           Objective:      /60 (BP Location: Left arm, Patient Position: Sitting, Cuff Size: Standard)   Pulse 67   Temp 97 5 °F (36 4 °C) (Tympanic)   Ht 5' 3 78" (1 62 m)   Wt 46 3 kg (102 lb)   SpO2 98%   BMI 17 63 kg/m²          Physical Exam   Constitutional: She is oriented to person, place, and time  She appears well-developed and well-nourished  No distress  HENT:   Mouth/Throat: No oropharyngeal exudate  Eyes: Pupils are equal, round, and reactive to light  EOM are normal  Right eye exhibits no discharge  Left eye exhibits no discharge  No scleral icterus  Neck: Normal range of motion  No JVD present  Cardiovascular: Normal rate, regular rhythm and normal heart sounds  No murmur heard  Pulmonary/Chest: Effort normal and breath sounds normal    Abdominal: Soft  Musculoskeletal: She exhibits no edema  Neurological: She is alert and oriented to person, place, and time  Skin: Skin is warm and dry  She is not diaphoretic  Psychiatric: She has a normal mood and affect

## 2019-12-09 NOTE — PROGRESS NOTES
ADULT ANNUAL PHYSICAL  [facility]    NAME: Bart Schmid  AGE: 27 y o  SEX: female  : 1995     DATE: 2019     Assessment and Plan:     Problem List Items Addressed This Visit     None          Health maintenance and preventative care screenings were discussed with patient today  Appropriate education was printed on patient's after visit summary  · Discussed risks/benefits of screening for   · Pelvic exam and Pap smear  · Screening for breast cancer  · Physical exam  · Cholesterol test  · Blood pressure screening  · Eye exam  · Dental exam  · Colon Cancer  Bart Schmid agrees to look in to having screenings done, if not already completed  · Immunizations were reviewed  ·   · There is no immunization history on file for this patient  Counseling:  Injury prevention: discussed safety/seat belts, safety helmets, smoke detectors, carbon dioxide detectors, and smoking near bedding or upholstery  Sexual health: discussed sexually transmitted diseases, partner selection, use of condoms, avoidance of unintended pregnancy, and contraceptive alternatives  · Exercise: the importance of regular exercise/physical activity was discussed  Recommend exercise 3-5 times per week for at least 30 minutes  No follow-ups on file  Chief Complaint:     Chief Complaint   Patient presents with   Marni Splinter Establish Care     new patient   DL physical        History of Present Illness:     Adult Annual Physical   Patient here for a comprehensive physical exam  The patient reports problems - discuss overall health over past 6 months   Diet and Physical Activity  · Diet/Nutrition: well balanced diet  · Weight concerns: None / Has concerns what where addressed today  · Exercise: walking, moderate cardiovascular exercise and less than 30 minutes on average       Depression Screening  PHQ-9 Depression Screening    PHQ-9:    Frequency of the following problems over the past two weeks:              General Health  · Sleep: sleeps poorly and gets 4-6 hours of sleep on average  · Hearing: normal - bilateral   · Vision: no vision problems and goes for regular eye exams  · Dental: regular dental visits, no dental visits for >1 year, brushes teeth three times daily and flosses teeth occasionally  /GYN Health  · Last menstrual period: 11/09/2019  · Contraceptive method: barrier methods  · History of STDs?: no           Review of Systems:     Review of Systems   Past Medical History:     Past Medical History:   Diagnosis Date    Anxiety     Psychiatric disorder       Past Surgical History:     Past Surgical History:   Procedure Laterality Date    WRIST FRACTURE SURGERY Right     04/2019      Social History:     Social History     Socioeconomic History    Marital status: Single     Spouse name: None    Number of children: None    Years of education: None    Highest education level: None   Occupational History    None   Social Needs    Financial resource strain: Not hard at all   [place] insecurity:     Worry: Never true     Inability: Never true    Transportation needs:     Medical: No     Non-medical: No   Tobacco Use    Smoking status: Never Smoker    Smokeless tobacco: Never Used   Substance and Sexual Activity    Alcohol use: Yes     Comment: socially    Drug use: Yes     Types: Marijuana     Comment: daily    Sexual activity: Not Currently   Lifestyle    Physical activity:     Days per week: 3 days     Minutes per session: 30 min    Stress:  Only a little   Relationships    Social connections:     Talks on phone: More than three times a week     Gets together: More than three times a week     Attends [employer] service: 1 to 4 times per year     Active member of club or organization: No     Attends meetings of clubs or organizations: Never     Relationship status: Never     Intimate partner violence:     Fear of current or ex partner: No     Emotionally abused: No     Physically abused: No     Forced sexual activity: No   Other Topics Concern    None   Social History Narrative    Reports no medical history      Family History:     Family History   Family history unknown: Yes      Current Medications:     Current Outpatient Medications   Medication Sig Dispense Refill    prazosin (MINIPRESS) 2 mg capsule Take 2 mg by mouth daily at bedtime      traZODone (DESYREL) 50 mg tablet Take 50 mg by mouth daily at bedtime      valACYclovir (VALTREX) 500 mg tablet Take 500 mg by mouth daily       No current facility-administered medications for this visit  Allergies:     No Known Allergies   Objective:     Ht 5' 3 78" (1 62 m)   Wt 46 3 kg (102 lb)   BMI 17 63 kg/m²     Physical Exam     Health Maintenance:     Health Maintenance   Topic Date Due    Depression Screening PHQ  1995    DTaP,Tdap,and Td Vaccines (1 - Tdap) 05/02/2008    HPV Vaccine (1 - Female 2-dose series) 05/02/2008    HIV Screening  05/02/2012    Chlamydia Screening  05/02/2013    BMI: Followup Plan  05/02/2015    Cervical Cancer Screening  05/02/2018    Influenza Vaccine  07/01/2019    BMI: Adult  11/21/2020    Pneumococcal Vaccine: 65+ Years (1 of 2 - PCV13) 05/02/2062    Pneumococcal Vaccine: Pediatrics (0 to 5 Years) and At-Risk Patients (6 to 59 Years)  Aged Out    HIB Vaccine  Aged Out    Hepatitis B Vaccine  Aged Out    IPV Vaccine  Aged Out    Hepatitis A Vaccine  Aged Out    Meningococcal ACWY Vaccine  Aged Out       There is no immunization history on file for this patient      Terry Gibson MA  [facility] Jennifer Aaron

## 2019-12-30 ENCOUNTER — APPOINTMENT (EMERGENCY)
Dept: RADIOLOGY | Facility: HOSPITAL | Age: 24
End: 2019-12-30

## 2019-12-30 ENCOUNTER — HOSPITAL ENCOUNTER (EMERGENCY)
Facility: HOSPITAL | Age: 24
Discharge: HOME | End: 2019-12-30
Attending: EMERGENCY MEDICINE | Admitting: EMERGENCY MEDICINE

## 2019-12-30 ENCOUNTER — APPOINTMENT (EMERGENCY)
Dept: CT IMAGING | Facility: HOSPITAL | Age: 24
End: 2019-12-30

## 2019-12-30 VITALS
TEMPERATURE: 99.2 F | WEIGHT: 97 LBS | HEART RATE: 86 BPM | RESPIRATION RATE: 17 BRPM | DIASTOLIC BLOOD PRESSURE: 54 MMHG | SYSTOLIC BLOOD PRESSURE: 108 MMHG | OXYGEN SATURATION: 97 % | BODY MASS INDEX: 16.77 KG/M2

## 2019-12-30 DIAGNOSIS — J18.9 PNEUMONIA: Primary | ICD-10-CM

## 2019-12-30 LAB
ANION GAP SERPL CALCULATED.3IONS-SCNC: 9 MMOL/L (ref 4–13)
BACTERIA UR QL AUTO: ABNORMAL /HPF
BASOPHILS # BLD AUTO: 0.01 THOUSANDS/ΜL (ref 0–0.1)
BASOPHILS NFR BLD AUTO: 0 % (ref 0–1)
BILIRUB UR QL STRIP: ABNORMAL
BUN SERPL-MCNC: 11 MG/DL (ref 5–25)
CALCIUM SERPL-MCNC: 8.7 MG/DL (ref 8.3–10.1)
CHLORIDE SERPL-SCNC: 101 MMOL/L (ref 100–108)
CLARITY UR: ABNORMAL
CO2 SERPL-SCNC: 27 MMOL/L (ref 21–32)
COLOR UR: ABNORMAL
CREAT SERPL-MCNC: 0.77 MG/DL (ref 0.6–1.3)
D DIMER PPP FEU-MCNC: 0.27 UG/ML FEU
EOSINOPHIL # BLD AUTO: 0.01 THOUSAND/ΜL (ref 0–0.61)
EOSINOPHIL NFR BLD AUTO: 0 % (ref 0–6)
ERYTHROCYTE [DISTWIDTH] IN BLOOD BY AUTOMATED COUNT: 12.2 % (ref 11.6–15.1)
EXT PREG TEST URINE: NEGATIVE
EXT. CONTROL ED NAV: NORMAL
GFR SERPL CREATININE-BSD FRML MDRD: 110 ML/MIN/1.73SQ M
GLUCOSE SERPL-MCNC: 90 MG/DL (ref 65–140)
GLUCOSE UR STRIP-MCNC: NEGATIVE MG/DL
HCT VFR BLD AUTO: 36.8 % (ref 34.8–46.1)
HGB BLD-MCNC: 12.3 G/DL (ref 11.5–15.4)
HGB UR QL STRIP.AUTO: ABNORMAL
IMM GRANULOCYTES # BLD AUTO: 0.02 THOUSAND/UL (ref 0–0.2)
IMM GRANULOCYTES NFR BLD AUTO: 0 % (ref 0–2)
KETONES UR STRIP-MCNC: ABNORMAL MG/DL
LEUKOCYTE ESTERASE UR QL STRIP: NEGATIVE
LYMPHOCYTES # BLD AUTO: 1.1 THOUSANDS/ΜL (ref 0.6–4.47)
LYMPHOCYTES NFR BLD AUTO: 16 % (ref 14–44)
MCH RBC QN AUTO: 31.9 PG (ref 26.8–34.3)
MCHC RBC AUTO-ENTMCNC: 33.4 G/DL (ref 31.4–37.4)
MCV RBC AUTO: 96 FL (ref 82–98)
MONOCYTES # BLD AUTO: 1.28 THOUSAND/ΜL (ref 0.17–1.22)
MONOCYTES NFR BLD AUTO: 18 % (ref 4–12)
MUCOUS THREADS UR QL AUTO: ABNORMAL
NEUTROPHILS # BLD AUTO: 4.67 THOUSANDS/ΜL (ref 1.85–7.62)
NEUTS SEG NFR BLD AUTO: 66 % (ref 43–75)
NITRITE UR QL STRIP: NEGATIVE
NON-SQ EPI CELLS URNS QL MICRO: ABNORMAL /HPF
NRBC BLD AUTO-RTO: 0 /100 WBCS
NT-PROBNP SERPL-MCNC: 169 PG/ML
PH UR STRIP.AUTO: 5.5 [PH] (ref 4.5–8)
PLATELET # BLD AUTO: 145 THOUSANDS/UL (ref 149–390)
PMV BLD AUTO: 11.2 FL (ref 8.9–12.7)
POTASSIUM SERPL-SCNC: 3.8 MMOL/L (ref 3.5–5.3)
PROT UR STRIP-MCNC: ABNORMAL MG/DL
RBC # BLD AUTO: 3.85 MILLION/UL (ref 3.81–5.12)
RBC #/AREA URNS AUTO: ABNORMAL /HPF
SODIUM SERPL-SCNC: 137 MMOL/L (ref 136–145)
SP GR UR STRIP.AUTO: >=1.03 (ref 1–1.03)
TROPONIN I SERPL-MCNC: <0.02 NG/ML
UROBILINOGEN UR QL STRIP.AUTO: 0.2 E.U./DL
WBC # BLD AUTO: 7.09 THOUSAND/UL (ref 4.31–10.16)
WBC #/AREA URNS AUTO: ABNORMAL /HPF

## 2019-12-30 PROCEDURE — 80048 BASIC METABOLIC PNL TOTAL CA: CPT | Performed by: EMERGENCY MEDICINE

## 2019-12-30 PROCEDURE — 85025 COMPLETE CBC W/AUTO DIFF WBC: CPT | Performed by: EMERGENCY MEDICINE

## 2019-12-30 PROCEDURE — 71275 CT ANGIOGRAPHY CHEST: CPT

## 2019-12-30 PROCEDURE — 96361 HYDRATE IV INFUSION ADD-ON: CPT

## 2019-12-30 PROCEDURE — 85379 FIBRIN DEGRADATION QUANT: CPT | Performed by: EMERGENCY MEDICINE

## 2019-12-30 PROCEDURE — 81025 URINE PREGNANCY TEST: CPT | Performed by: EMERGENCY MEDICINE

## 2019-12-30 PROCEDURE — 96375 TX/PRO/DX INJ NEW DRUG ADDON: CPT

## 2019-12-30 PROCEDURE — 71046 X-RAY EXAM CHEST 2 VIEWS: CPT

## 2019-12-30 PROCEDURE — 84484 ASSAY OF TROPONIN QUANT: CPT | Performed by: EMERGENCY MEDICINE

## 2019-12-30 PROCEDURE — 99284 EMERGENCY DEPT VISIT MOD MDM: CPT

## 2019-12-30 PROCEDURE — 81001 URINALYSIS AUTO W/SCOPE: CPT

## 2019-12-30 PROCEDURE — 93005 ELECTROCARDIOGRAM TRACING: CPT

## 2019-12-30 PROCEDURE — 83880 ASSAY OF NATRIURETIC PEPTIDE: CPT | Performed by: EMERGENCY MEDICINE

## 2019-12-30 PROCEDURE — 99285 EMERGENCY DEPT VISIT HI MDM: CPT | Performed by: EMERGENCY MEDICINE

## 2019-12-30 PROCEDURE — 36415 COLL VENOUS BLD VENIPUNCTURE: CPT | Performed by: EMERGENCY MEDICINE

## 2019-12-30 PROCEDURE — 96374 THER/PROPH/DIAG INJ IV PUSH: CPT

## 2019-12-30 PROCEDURE — 94640 AIRWAY INHALATION TREATMENT: CPT

## 2019-12-30 RX ORDER — ONDANSETRON 2 MG/ML
4 INJECTION INTRAMUSCULAR; INTRAVENOUS ONCE
Status: COMPLETED | OUTPATIENT
Start: 2019-12-30 | End: 2019-12-30

## 2019-12-30 RX ORDER — LIDOCAINE 50 MG/G
1 PATCH TOPICAL DAILY
Qty: 7 PATCH | Refills: 0 | Status: SHIPPED | OUTPATIENT
Start: 2019-12-30

## 2019-12-30 RX ORDER — KETOROLAC TROMETHAMINE 30 MG/ML
15 INJECTION, SOLUTION INTRAMUSCULAR; INTRAVENOUS ONCE
Status: COMPLETED | OUTPATIENT
Start: 2019-12-30 | End: 2019-12-30

## 2019-12-30 RX ORDER — ACETAMINOPHEN 325 MG/1
650 TABLET ORAL ONCE
Status: COMPLETED | OUTPATIENT
Start: 2019-12-30 | End: 2019-12-30

## 2019-12-30 RX ORDER — AZITHROMYCIN 250 MG/1
250 TABLET, FILM COATED ORAL DAILY
Qty: 6 TABLET | Refills: 0 | Status: SHIPPED | OUTPATIENT
Start: 2019-12-30 | End: 2020-01-04

## 2019-12-30 RX ORDER — IBUPROFEN 600 MG/1
600 TABLET ORAL EVERY 6 HOURS PRN
Qty: 30 TABLET | Refills: 0 | Status: SHIPPED | OUTPATIENT
Start: 2019-12-30 | End: 2020-01-09

## 2019-12-30 RX ORDER — ALBUTEROL SULFATE 2.5 MG/3ML
5 SOLUTION RESPIRATORY (INHALATION) ONCE
Status: COMPLETED | OUTPATIENT
Start: 2019-12-30 | End: 2019-12-30

## 2019-12-30 RX ADMIN — IOHEXOL 75 ML: 350 INJECTION, SOLUTION INTRAVENOUS at 14:41

## 2019-12-30 RX ADMIN — ALBUTEROL SULFATE 5 MG: 2.5 SOLUTION RESPIRATORY (INHALATION) at 13:02

## 2019-12-30 RX ADMIN — ONDANSETRON 4 MG: 2 INJECTION INTRAMUSCULAR; INTRAVENOUS at 13:16

## 2019-12-30 RX ADMIN — SODIUM CHLORIDE 1000 ML: 0.9 INJECTION, SOLUTION INTRAVENOUS at 13:06

## 2019-12-30 RX ADMIN — ACETAMINOPHEN 650 MG: 325 TABLET ORAL at 13:21

## 2019-12-30 RX ADMIN — KETOROLAC TROMETHAMINE 15 MG: 30 INJECTION, SOLUTION INTRAMUSCULAR at 13:18

## 2019-12-30 RX ADMIN — MORPHINE SULFATE 2 MG: 2 INJECTION, SOLUTION INTRAMUSCULAR; INTRAVENOUS at 13:18

## 2019-12-30 NOTE — ED NOTES
Pt reports stuffy nose, chills, body aches that started Friday  Pt denies cough  Pt also c/o pain in left posterior ribs that feels like "being stabbed "  Pt reports pain is worse with palpation and states she cannot take a deep breath due to pain  Pt very anxious  Pt not taking any prescribed meds as she states she needs to make an appointment with her psych dr for meds to be re-prescribed          Stephanie Bernardo RN  12/30/19 1571

## 2019-12-30 NOTE — ED NOTES
Pt sitting up on litter, neb treatment in progress  Pt awake and alert and appears to be in no acute distress        Alexandrea Menon, RN  12/30/19 5144

## 2019-12-30 NOTE — ED PROVIDER NOTES
History  Chief Complaint   Patient presents with    Back Pain     was traveling to [facility]  over the weekend and now c/o sharp pain in ribs "where lungs are "      URI     today woke up sick  denies fevers or sick contacts  hasn't eaten in 2 days  History provided by:  Patient   used: No    Chest Pain   Pain location:  L lateral chest  Pain quality: sharp and stabbing    Pain radiates to:  Does not radiate  Pain radiates to the back: no    Pain severity:  Severe  Onset quality:  Sudden  Duration:  2 hours  Timing:  Constant  Progression:  Unchanged  Chronicity:  New  Context: breathing and movement    Relieved by:  Certain positions  Worsened by: Movement and certain positions (palpation)  Ineffective treatments:  None tried  Associated symptoms: anorexia    Associated symptoms: no abdominal pain, no back pain, no cough, no fever, no headache, no nausea, no numbness, no palpitations, no shortness of breath, not vomiting and no weakness    Risk factors: smoking    Risk factors: no coronary artery disease and no prior DVT/PE    Risk factors comment:  Recent trip to [place] by car  Prior to Admission Medications   Prescriptions Last Dose Informant Patient Reported? Taking?   prazosin (MINIPRESS) 2 mg capsule Not Taking at Unknown time Self Yes No   Sig: Take 2 mg by mouth daily at bedtime   traZODone (DESYREL) 50 mg tablet Not Taking at Unknown time Self Yes No   Sig: Take 50 mg by mouth daily at bedtime   valACYclovir (VALTREX) 500 mg tablet Not Taking at Unknown time Self Yes No   Sig: Take 500 mg by mouth daily      Facility-Administered Medications: None       Past Medical History:   Diagnosis Date    Anxiety     Psychiatric disorder        Past Surgical History:   Procedure Laterality Date    WRIST FRACTURE SURGERY Right     04/2019       Family History   Family history unknown: Yes     I have reviewed and agree with the history as documented      Social History Tobacco Use    Smoking status: Never Smoker    Smokeless tobacco: Never Used   Substance Use Topics    Alcohol use: Not Currently     Frequency: Never    Drug use: Yes     Types: Marijuana     Comment: daily        Review of Systems   Constitutional: Positive for appetite change  Negative for chills and fever  HENT: Negative for congestion and sore throat  Respiratory: Negative for cough, chest tightness and shortness of breath  Cardiovascular: Positive for chest pain  Negative for palpitations and leg swelling  Gastrointestinal: Positive for anorexia  Negative for abdominal pain, diarrhea, nausea and vomiting  Genitourinary: Negative for difficulty urinating and dysuria  Musculoskeletal: Negative for back pain and gait problem  Skin: Negative for rash  Neurological: Negative for weakness, numbness and headaches  All other systems reviewed and are negative  Physical Exam  Physical Exam   Constitutional: She appears well-developed and well-nourished  She is cooperative  Non-toxic appearance  She does not have a sickly appearance  She does not appear ill  No distress  HENT:   Head: Normocephalic and atraumatic  Right Ear: Hearing normal  No drainage or swelling  Left Ear: Hearing normal  No drainage or swelling  Mouth/Throat: Mucous membranes are normal    Eyes: Conjunctivae and lids are normal  Right eye exhibits no discharge  Left eye exhibits no discharge  Neck: Trachea normal and normal range of motion  No JVD present  Cardiovascular: Normal rate, regular rhythm, normal heart sounds, intact distal pulses and normal pulses  Exam reveals no gallop and no friction rub  No murmur heard  Pulmonary/Chest: Effort normal and breath sounds normal  No stridor  No respiratory distress  She has no wheezes  She has no rales  She exhibits tenderness  Abdominal: Soft  Normal appearance  There is no tenderness  There is no rebound, no guarding and no CVA tenderness  Musculoskeletal: Normal range of motion  She exhibits no edema or tenderness  Neurological: She is alert  She has normal strength  She exhibits normal muscle tone  GCS eye subscore is 4  GCS verbal subscore is 5  GCS motor subscore is 6  Skin: Skin is warm, dry and intact  No rash noted  She is not diaphoretic  No pallor  Psychiatric: She has a normal mood and affect  Her speech is normal  Cognition and memory are normal    Nursing note and vitals reviewed        Vital Signs  ED Triage Vitals   Temperature Pulse Respirations Blood Pressure SpO2   12/30/19 1144 12/30/19 1144 12/30/19 1144 12/30/19 1146 12/30/19 1144   99 2 °F (37 3 °C) (!) 109 16 111/63 97 %      Temp Source Heart Rate Source Patient Position - Orthostatic VS BP Location FiO2 (%)   12/30/19 1144 12/30/19 1356 12/30/19 1356 12/30/19 1356 --   Oral Monitor Sitting Right arm       Pain Score       12/30/19 1144       Worst Possible Pain           Vitals:    12/30/19 1144 12/30/19 1146 12/30/19 1356   BP:  111/63 108/54   Pulse: (!) 109  86   Patient Position - Orthostatic VS:   Sitting         Visual Acuity      ED Medications  Medications   ketorolac (TORADOL) injection 15 mg (15 mg Intravenous Given 12/30/19 1318)   morphine injection 2 mg (2 mg Intravenous Given 12/30/19 1318)   acetaminophen (TYLENOL) tablet 650 mg (650 mg Oral Given 12/30/19 1321)   sodium chloride 0 9 % bolus 1,000 mL (0 mL Intravenous Stopped 12/30/19 1555)   ondansetron (ZOFRAN) injection 4 mg (4 mg Intravenous Given 12/30/19 1316)   albuterol inhalation solution 5 mg (5 mg Nebulization Given 12/30/19 1302)   iohexol (OMNIPAQUE) 350 MG/ML injection (MULTI-DOSE) 75 mL (75 mL Intravenous Given 12/30/19 1441)       Diagnostic Studies  Results Reviewed     Procedure Component Value Units Date/Time    Basic metabolic panel [[id number]] Collected:  12/30/19 1246    Lab Status:  Final result Specimen:  Blood from Arm, Left Updated:  12/30/19 1319     Sodium 137 mmol/L Potassium 3 8 mmol/L      Chloride 101 mmol/L      CO2 27 mmol/L      ANION GAP 9 mmol/L      BUN 11 mg/dL      Creatinine 0 77 mg/dL      Glucose 90 mg/dL      Calcium 8 7 mg/dL      eGFR 110 ml/min/1 73sq m     Narrative:       [place] guidelines for Chronic Kidney Disease (CKD):     Stage 1 with normal or high GFR (GFR > 90 mL/min/1 73 square meters)    Stage 2 Mild CKD (GFR = 60-89 mL/min/1 73 square meters)    Stage 3A Moderate CKD (GFR = 45-59 mL/min/1 73 square meters)    Stage 3B Moderate CKD (GFR = 30-44 mL/min/1 73 square meters)    Stage 4 Severe CKD (GFR = 15-29 mL/min/1 73 square meters)    Stage 5 End Stage CKD (GFR <15 mL/min/1 73 square meters)  Note: GFR calculation is accurate only with a steady state creatinine    NT-BNP PRO [[id number]]  (Abnormal) Collected:  12/30/19 1246    Lab Status:  Final result Specimen:  Blood from Arm, Left Updated:  12/30/19 1319     NT-proBNP 169 pg/mL     Troponin I [[id number]]  (Normal) Collected:  12/30/19 1246    Lab Status:  Final result Specimen:  Blood from Arm, Left Updated:  12/30/19 1315     Troponin I <0 02 ng/mL     D-Dimer [[id number]]  (Normal) Collected:  12/30/19 1246    Lab Status:  Final result Specimen:  Blood from Arm, Left Updated:  12/30/19 1306     D-Dimer, Quant 0 27 ug/ml FEU     Urine Microscopic [[id number]]  (Abnormal) Collected:  12/30/19 1240    Lab Status:  Final result Specimen:  Urine, Clean Catch Updated:  12/30/19 1306     RBC, UA None Seen /hpf      WBC, UA 1-2 /hpf      Epithelial Cells Occasional /hpf      Bacteria, UA None Seen /hpf      MUCUS THREADS Moderate    CBC and differential [[id number]]  (Abnormal) Collected:  12/30/19 1246    Lab Status:  Final result Specimen:  Blood from Arm, Left Updated:  12/30/19 1254     WBC 7 09 Thousand/uL      RBC 3 85 Million/uL      Hemoglobin 12 3 g/dL      Hematocrit 36 8 %      MCV 96 fL      MCH 31 9 pg      MCHC 33 4 g/dL      RDW 12 2 %      MPV 11 2 fL Platelets 471 Thousands/uL      nRBC 0 /100 WBCs      Neutrophils Relative 66 %      Immat GRANS % 0 %      Lymphocytes Relative 16 %      Monocytes Relative 18 %      Eosinophils Relative 0 %      Basophils Relative 0 %      Neutrophils Absolute 4 67 Thousands/µL      Immature Grans Absolute 0 02 Thousand/uL      Lymphocytes Absolute 1 10 Thousands/µL      Monocytes Absolute 1 28 Thousand/µL      Eosinophils Absolute 0 01 Thousand/µL      Basophils Absolute 0 01 Thousands/µL     POCT urinalysis dipstick [[id number]]  (Abnormal) Resulted:  12/30/19 1243    Lab Status:  Final result Specimen:  Urine Updated:  12/30/19 1243    POCT pregnancy, urine [[id number]]  (Normal) Resulted:  12/30/19 1242    Lab Status:  Final result Updated:  12/30/19 1242     EXT PREG TEST UR (Ref: Negative) negative     Control valid    Urine Macroscopic, POC [[id number]]  (Abnormal) Collected:  12/30/19 1240    Lab Status:  Final result Specimen:  Urine Updated:  12/30/19 1241     Color, UA Meka     Clarity, UA Cloudy     pH, UA 5 5     Leukocytes, UA Negative     Nitrite, UA Negative     Protein,  (2+) mg/dl      Glucose, UA Negative mg/dl      Ketones, UA 80 (3+) mg/dl      Urobilinogen, UA 0 2 E U /dl      Bilirubin, UA Interference- unable to analyze     Blood, UA Moderate     Specific Gravity, UA >=1 030    Narrative:       CLINITEK RESULT                 CTA ED chest PE study   Final Result by Oswaldo Fuentes MD (12/30 1531)      No pulmonary embolism  Left lower lobe pneumonia with reactive left hilar lymphadenopathy  Follow-up chest x-ray recommended after treatment in one month to ensure complete resolution  The study was marked in [facility] for follow-up notification  Workstation performed: [id number]         XR chest 2 views   ED Interpretation by Jessica Garzon MD (12/30 3928)   I have personally reviewed the x-ray and my findings are: Left sided opacity posteriorly        Final Result by Jamar Vigil Balbir Gurrola MD (12/30 1426)      Left lower lobe consolidation      * I personally discussed this result with Sable Headings on 12/30/2019 2:26 PM             Workstation performed: [id number]                    Procedures  ECG 12 Lead Documentation Only  Date/Time: 12/30/2019 1:50 PM  Performed by: Ward Anderson MD  Authorized by: Ward Anderson MD     Indications / Diagnosis:  Left sided CP  ECG reviewed by me, the ED Provider: yes    Patient location:  ED  Interpretation:     Interpretation: normal    Rate:     ECG rate:  70s    ECG rate assessment: normal    Rhythm:     Rhythm: sinus rhythm    Ectopy:     Ectopy: none    QRS:     QRS axis:  Normal    QRS intervals:  Normal  Conduction:     Conduction: normal    ST segments:     ST segments:  Normal  T waves:     T waves: normal               ED Course  ED Course as of Dec 30 1558   Mon Dec 30, 2019   1437 Patient has an abnormal chest x-ray specially seen on the lateral   Possible pneumonia although she does not have any pneumonia type symptoms  Also concerned about the possibility of a Vidal's hump type sign/PE so will check CT of the chest      1558 CT scan reveals no pulmonary embolus but she has pneumonia  I gave her prescription for Zithromax Lidoderm patches and ibuprofen  She has a family doctor to follow up with  She is not hypoxic and currently is not running a fever  Possibly could be viral                                   MDM  Number of Diagnoses or Management Options  Diagnosis management comments: Very reproducible left-sided chest wall tenderness  I suspect this is musculoskeletal chest wall pain  Findings not consistent with pericarditis an EKG did not show any acute changes consistent with that or with any ischemia  Heart rate is normal   D-dimer is negative  She is low risk  Possible pleurisy  Patient really isn't coughing to suggest pneumonia or flu and she is currently afebrile         Amount and/or Complexity of Data Reviewed  Clinical lab tests: ordered and reviewed  Tests in the radiology section of CPT®: ordered and reviewed  Tests in the medicine section of CPT®: ordered and reviewed  Independent visualization of images, tracings, or specimens: yes    Patient Progress  Patient progress: stable        Disposition  Final diagnoses:   Pneumonia     Time reflects when diagnosis was documented in both MDM as applicable and the Disposition within this note     Time User Action Codes Description Comment    12/30/2019  3:41 PM Rainer العلي [[id number]] Pneumonia       ED Disposition     ED Disposition Condition Date/Time Comment    Discharge Stable Mon Dec 30, 2019  3:41 PM Demetriokarissatoribio Langley discharge to home/self care              Follow-up Information     Follow up With Specialties Details Why Contact Info    Aneudy Piña MD Family Medicine Schedule an appointment as soon as possible for a visit in 2 days If symptoms worsen [address]  [phone]            Discharge Medication List as of 12/30/2019  3:51 PM      START taking these medications    Details   azithromycin (ZITHROMAX) 250 mg tablet Take 1 tablet (250 mg total) by mouth daily for 5 days Take first 2 tablets together, then 1 every day until finished , Starting Mon 12/30/2019, Until Sat 1/4/2020, Print      ibuprofen (MOTRIN) 600 mg tablet Take 1 tablet (600 mg total) by mouth every 6 (six) hours as needed for mild pain for up to 10 days, Starting Mon 12/30/2019, Until Thu 1/9/2020, Print      lidocaine (LIDODERM) 5 % Apply 1 patch topically daily Remove & Discard patch within 12 hours or as directed by MD, Starting Mon 12/30/2019, Print         CONTINUE these medications which have NOT CHANGED    Details   prazosin (MINIPRESS) 2 mg capsule Take 2 mg by mouth daily at bedtime, Historical Med      traZODone (DESYREL) 50 mg tablet Take 50 mg by mouth daily at bedtime, Historical Med      valACYclovir (VALTREX) 500 mg tablet Take 500 mg by mouth daily, Starting Tue 3/13/2018, Historical Med           No discharge procedures on file      ED Provider  Electronically Signed by           Regina Macias MD  12/30/19 4780

## 2019-12-31 LAB
ATRIAL RATE: 76 BPM
P AXIS: 84 DEGREES
PR INTERVAL: 158 MS
QRS AXIS: 91 DEGREES
QRSD INTERVAL: 78 MS
QT INTERVAL: 362 MS
QTC INTERVAL: 407 MS
T WAVE AXIS: 90 DEGREES
VENTRICULAR RATE: 76 BPM

## 2019-12-31 PROCEDURE — 93010 ELECTROCARDIOGRAM REPORT: CPT | Performed by: INTERNAL MEDICINE

## 2021-09-21 ENCOUNTER — HOSPITAL ENCOUNTER (EMERGENCY)
Facility: HOSPITAL | Age: 26
Discharge: HOME | End: 2021-09-21
Attending: EMERGENCY MEDICINE
Payer: COMMERCIAL

## 2021-09-21 VITALS
TEMPERATURE: 98.1 F | HEART RATE: 82 BPM | BODY MASS INDEX: 16.73 KG/M2 | SYSTOLIC BLOOD PRESSURE: 102 MMHG | WEIGHT: 96.78 LBS | OXYGEN SATURATION: 98 % | DIASTOLIC BLOOD PRESSURE: 76 MMHG | RESPIRATION RATE: 20 BRPM

## 2021-09-21 DIAGNOSIS — K08.89 PAIN, DENTAL: Primary | ICD-10-CM

## 2021-09-21 PROCEDURE — 96372 THER/PROPH/DIAG INJ SC/IM: CPT

## 2021-09-21 PROCEDURE — 99284 EMERGENCY DEPT VISIT MOD MDM: CPT | Performed by: EMERGENCY MEDICINE

## 2021-09-21 PROCEDURE — 99283 EMERGENCY DEPT VISIT LOW MDM: CPT

## 2021-09-21 RX ORDER — TRAMADOL HYDROCHLORIDE 50 MG/1
50 TABLET ORAL EVERY 6 HOURS PRN
Qty: 10 TABLET | Refills: 0 | Status: SHIPPED | OUTPATIENT
Start: 2021-09-21

## 2021-09-21 RX ORDER — KETOROLAC TROMETHAMINE 30 MG/ML
30 INJECTION, SOLUTION INTRAMUSCULAR; INTRAVENOUS ONCE
Status: COMPLETED | OUTPATIENT
Start: 2021-09-21 | End: 2021-09-21

## 2021-09-21 RX ADMIN — KETOROLAC TROMETHAMINE 30 MG: 30 INJECTION, SOLUTION INTRAMUSCULAR; INTRAVENOUS at 15:58

## 2021-09-21 NOTE — ED PROVIDER NOTES
History  Chief Complaint   Patient presents with    Dental Pain     left lower dental pain and swelling since Friday  states she saw a dentist earlier today for the same and was given amoxicilin  has only had 1 dose of it at this time  still having pain     26 yo female with a history of anxiety, dipolar disorder, and depression presents to the ED complaining of dental pain  The patient reports a "throbbing" pain at one of her left lower molars since Friday  (+) Mild left sided facial swelling x "a few days"  No drooling or trismus  The patient was seen by her dentist this morning and prescribed amoxicillin but she claims that the dentist told her to "go to the ER for pain medications"  She is taking Motrin and Tylenol at home without significant relief  No tongue swelling  No gum swelling  She denies fevers/chills  No other specific complaints  Prior to Admission Medications   Prescriptions Last Dose Informant Patient Reported? Taking?   ibuprofen (MOTRIN) 600 mg tablet   No No   Sig: Take 1 tablet (600 mg total) by mouth every 6 (six) hours as needed for mild pain for up to 10 days   lidocaine (LIDODERM) 5 %   No No   Sig: Apply 1 patch topically daily Remove & Discard patch within 12 hours or as directed by MD   prazosin (MINIPRESS) 2 mg capsule  Self Yes No   Sig: Take 2 mg by mouth daily at bedtime   traZODone (DESYREL) 50 mg tablet  Self Yes No   Sig: Take 50 mg by mouth daily at bedtime   valACYclovir (VALTREX) 500 mg tablet  Self Yes No   Sig: Take 500 mg by mouth daily      Facility-Administered Medications: None       Past Medical History:   Diagnosis Date    Anxiety     Bipolar disorder ([facility] [address] )     Depression     Psychiatric disorder        Past Surgical History:   Procedure Laterality Date    WRIST FRACTURE SURGERY Right     04/2019       Family History   Family history unknown: Yes     I have reviewed and agree with the history as documented      E-Cigarette/Vaping    E-Cigarette Use Never User      E-Cigarette/Vaping Substances     Social History     Tobacco Use    Smoking status: Never Smoker    Smokeless tobacco: Never Used   Vaping Use    Vaping Use: Never used   Substance Use Topics    Alcohol use: Not Currently    Drug use: Yes     Types: Marijuana     Comment: daily       Review of Systems   Constitutional: Negative for chills and fever  HENT: Positive for dental problem and facial swelling  Negative for drooling, ear pain, sore throat, trouble swallowing and voice change  Eyes: Negative for visual disturbance  Respiratory: Negative for shortness of breath  Cardiovascular: Negative for chest pain  Gastrointestinal: Negative for abdominal pain, diarrhea and vomiting  Endocrine: Negative for cold intolerance and heat intolerance  Genitourinary: Negative for dysuria and frequency  Musculoskeletal: Negative for back pain  Skin: Negative for rash  Allergic/Immunologic: Negative for immunocompromised state  Neurological: Negative for weakness and numbness  Hematological: Negative for adenopathy  Psychiatric/Behavioral: Negative for self-injury  Physical Exam  Physical Exam  Constitutional:       General: She is not in acute distress  Appearance: She is well-developed  HENT:      Head: Normocephalic and atraumatic  Jaw: Swelling present  No trismus  Mouth/Throat:      Dentition: Abnormal dentition  Dental tenderness present  No gingival swelling or dental abscesses  Tongue: No lesions  Tongue does not deviate from midline  Pharynx: Oropharynx is clear  Comments: (+) Generally poor dentition  (+) Multiple dental caries  No obvious abscess or drainable collection  Eyes:      Pupils: Pupils are equal, round, and reactive to light  Cardiovascular:      Rate and Rhythm: Normal rate and regular rhythm  Pulmonary:      Effort: Pulmonary effort is normal       Breath sounds: Normal breath sounds     Abdominal:      General: There is no distension  Palpations: Abdomen is soft  Tenderness: There is no abdominal tenderness  Musculoskeletal:         General: Normal range of motion  Cervical back: Normal range of motion and neck supple  Skin:     General: Skin is warm and dry  Neurological:      Mental Status: She is alert and oriented to person, place, and time  Vital Signs  ED Triage Vitals   Temperature Pulse Respirations Blood Pressure SpO2   09/21/21 1544 09/21/21 1544 09/21/21 1544 09/21/21 1544 09/21/21 1544   98 1 °F (36 7 °C) 82 20 102/76 98 %      Temp Source Heart Rate Source Patient Position - Orthostatic VS BP Location FiO2 (%)   09/21/21 1544 09/21/21 1544 09/21/21 1544 09/21/21 1544 --   Tympanic Monitor Sitting Left arm       Pain Score       09/21/21 1558       Worst Possible Pain           Vitals:    09/21/21 1544   BP: 102/76   Pulse: 82   Patient Position - Orthostatic VS: Sitting         Visual Acuity      ED Medications  Medications   ketorolac (TORADOL) injection 30 mg (30 mg Intramuscular Given 9/21/21 1558)       Diagnostic Studies  Results Reviewed     None                 No orders to display              Procedures  Procedures         ED Course                                           MDM  Number of Diagnoses or Management Options  Pain, dental  Diagnosis management comments: The patient is well appearing with stable vital signs  (+) Multiple dental caries but no appreciable abscess  The patient has only taken one dose of amoxicillin and is already scheduled for a dental extraction later this week  Plan for pain control and close follow up with Dental as scheduled  The patient was encouraged to take her antibiotic as prescribed  She is agreeable to this plan  Strict return precautions provided      Patient Progress  Patient progress: stable      Disposition  Final diagnoses:   Pain, dental     Time reflects when diagnosis was documented in both MDM as applicable and the Disposition within this note     Time User Action Codes Description Comment    9/21/2021  3:55 PM Juana Odonnell Add [K08 89] Pain, dental       ED Disposition     ED Disposition Condition Date/Time Comment    Discharge Stable Tue Sep 21, 2021  3:55 PM Ruben Pierce discharge to home/self care  Follow-up Information     Follow up With Specialties Details Why [address]  Schedule an appointment as soon as possible for a visit   [address] [place] [address]  [phone]          Discharge Medication List as of 9/21/2021  3:58 PM      START taking these medications    Details   benzocaine (ORAJEL) 10 % mucosal gel Apply 1 application to the mouth or throat as needed for mucositis, Starting Tue 9/21/2021, Normal      traMADol (ULTRAM) 50 mg tablet Take 1 tablet (50 mg total) by mouth every 6 (six) hours as needed for moderate pain for up to 10 doses, Starting Tue 9/21/2021, Normal         CONTINUE these medications which have NOT CHANGED    Details   ibuprofen (MOTRIN) 600 mg tablet Take 1 tablet (600 mg total) by mouth every 6 (six) hours as needed for mild pain for up to 10 days, Starting Mon 12/30/2019, Until u 1/9/2020, Print      lidocaine (LIDODERM) 5 % Apply 1 patch topically daily Remove & Discard patch within 12 hours or as directed by MD, Starting Mon 12/30/2019, Print      prazosin (MINIPRESS) 2 mg capsule Take 2 mg by mouth daily at bedtime, Historical Med      traZODone (DESYREL) 50 mg tablet Take 50 mg by mouth daily at bedtime, Historical Med      valACYclovir (VALTREX) 500 mg tablet Take 500 mg by mouth daily, Starting Tue 3/13/2018, Historical Med           No discharge procedures on file      PDMP Review     None          ED Provider  Electronically Signed by           Andres Blanchard MD  09/21/21 7027

## 2021-09-22 ENCOUNTER — HOSPITAL ENCOUNTER (EMERGENCY)
Facility: HOSPITAL | Age: 26
Discharge: HOME | End: 2021-09-22
Attending: EMERGENCY MEDICINE
Payer: COMMERCIAL

## 2021-09-22 VITALS
WEIGHT: 96 LBS | OXYGEN SATURATION: 100 % | HEART RATE: 86 BPM | BODY MASS INDEX: 16.59 KG/M2 | SYSTOLIC BLOOD PRESSURE: 112 MMHG | RESPIRATION RATE: 18 BRPM | DIASTOLIC BLOOD PRESSURE: 70 MMHG | TEMPERATURE: 98.9 F

## 2021-09-22 DIAGNOSIS — Z20.822 ENCOUNTER FOR LABORATORY TESTING FOR COVID-19 VIRUS: Primary | ICD-10-CM

## 2021-09-22 LAB — SARS-COV-2 RNA RESP QL NAA+PROBE: NEGATIVE

## 2021-09-22 PROCEDURE — 99281 EMR DPT VST MAYX REQ PHY/QHP: CPT | Performed by: EMERGENCY MEDICINE

## 2021-09-22 PROCEDURE — 99282 EMERGENCY DEPT VISIT SF MDM: CPT

## 2021-09-22 PROCEDURE — U0003 INFECTIOUS AGENT DETECTION BY NUCLEIC ACID (DNA OR RNA); SEVERE ACUTE RESPIRATORY SYNDROME CORONAVIRUS 2 (SARS-COV-2) (CORONAVIRUS DISEASE [COVID-19]), AMPLIFIED PROBE TECHNIQUE, MAKING USE OF HIGH THROUGHPUT TECHNOLOGIES AS DESCRIBED BY CMS-2020-01-R: HCPCS | Performed by: EMERGENCY MEDICINE

## 2021-09-22 PROCEDURE — U0005 INFEC AGEN DETEC AMPLI PROBE: HCPCS | Performed by: EMERGENCY MEDICINE

## 2021-09-22 NOTE — ED PROVIDER NOTES
History  Chief Complaint   Patient presents with    Labs Only     Pt  reports daughter being tested for COVID and requesting she receive test as well  Pt  denies any symptoms at this time  Patient is a 68-year-old female  Daughter was sent home from school with a headache  Patient and her daughter present for COVID testing  Patient is asymptomatic  No cough, chest pain, shortness of breath, or fever  Prior to Admission Medications   Prescriptions Last Dose Informant Patient Reported? Taking?   benzocaine (ORAJEL) 10 % mucosal gel   No No   Sig: Apply 1 application to the mouth or throat as needed for mucositis   ibuprofen (MOTRIN) 600 mg tablet   No No   Sig: Take 1 tablet (600 mg total) by mouth every 6 (six) hours as needed for mild pain for up to 10 days   lidocaine (LIDODERM) 5 %   No No   Sig: Apply 1 patch topically daily Remove & Discard patch within 12 hours or as directed by MD   prazosin (MINIPRESS) 2 mg capsule  Self Yes No   Sig: Take 2 mg by mouth daily at bedtime   traMADol (ULTRAM) 50 mg tablet   No No   Sig: Take 1 tablet (50 mg total) by mouth every 6 (six) hours as needed for moderate pain for up to 10 doses   traZODone (DESYREL) 50 mg tablet  Self Yes No   Sig: Take 50 mg by mouth daily at bedtime   valACYclovir (VALTREX) 500 mg tablet  Self Yes No   Sig: Take 500 mg by mouth daily      Facility-Administered Medications: None       Past Medical History:   Diagnosis Date    Anxiety     B[facility] disorder ([facility] [address] )     Depression     Psychiatric disorder        Past Surgical History:   Procedure Laterality Date    WRIST FRACTURE SURGERY Right     04/2019       Family History   Family history unknown: Yes     I have reviewed and agree with the history as documented      E-Cigarette/Vaping    E-Cigarette Use Never User      E-Cigarette/Vaping Substances     Social History     Tobacco Use    Smoking status: Never Smoker    Smokeless tobacco: Never Used   Vaping Use    Vaping Use: Never used   Substance Use Topics    Alcohol use: Not Currently    Drug use: Yes     Types: Marijuana     Comment: daily       Review of Systems   Constitutional: Negative for chills and fever  HENT: Negative for congestion  Respiratory: Negative for cough and shortness of breath  All other systems reviewed and are negative  Physical Exam  Physical Exam  Vitals reviewed  Constitutional:       General: She is not in acute distress  HENT:      Head: Normocephalic and atraumatic  Nose: Nose normal       Mouth/Throat:      Mouth: Mucous membranes are moist    Eyes:      General:         Right eye: No discharge  Left eye: No discharge  Conjunctiva/sclera: Conjunctivae normal    Pulmonary:      Effort: Pulmonary effort is normal  No respiratory distress  Musculoskeletal:         General: No deformity or signs of injury  Cervical back: Normal range of motion and neck supple  Skin:     General: Skin is warm and dry  Findings: No rash  Neurological:      General: No focal deficit present  Mental Status: She is alert and oriented to person, place, and time     Psychiatric:         Mood and Affect: Mood normal          Behavior: Behavior normal          Vital Signs  ED Triage Vitals [09/22/21 1417]   Temperature Pulse Respirations Blood Pressure SpO2   98 9 °F (37 2 °C) 86 18 112/70 100 %      Temp Source Heart Rate Source Patient Position - Orthostatic VS BP Location FiO2 (%)   Oral Monitor Sitting Right arm --      Pain Score       --           Vitals:    09/22/21 1417   BP: 112/70   Pulse: 86   Patient Position - Orthostatic VS: Sitting         Visual Acuity      ED Medications  Medications - No data to display    Diagnostic Studies  Results Reviewed     Procedure Component Value Units Date/Time    Novel Coronavirus [facility] [[id number]] Collected: 09/22/21 1428    Lab Status: No result Specimen: Nares from Nose                  No orders to display Procedures  Procedures         ED Course                             SBIRT 22yo+      Most Recent Value   SBIRT (24 yo +)   In order to provide better care to our patients, we are screening all of our patients for alcohol and drug use  Would it be okay to ask you these screening questions? Yes Filed at: 09/22/2021 1420   Initial Alcohol Screen: US AUDIT-C    1  How often do you have a drink containing alcohol? 1 Filed at: 09/22/2021 1420   2  How many drinks containing alcohol do you have on a typical day you are drinking? 0 Filed at: 09/22/2021 1420   3a  Male UNDER 65: How often do you have five or more drinks on one occasion? 0 Filed at: 09/22/2021 1420   3b  FEMALE Any Age, or MALE 65+: How often do you have 4 or more drinks on one occassion? 0 Filed at: 09/22/2021 1420   Audit-C Score  1 Filed at: 09/22/2021 1420   ROD: How many times in the past year have you    Used an illegal drug or used a prescription medication for non-medical reasons? Never Filed at: 09/22/2021 1420                    MDM  Number of Diagnoses or Management Options  Encounter for laboratory testing for COVID-19 virus  Diagnosis management comments: Asymptomatic  Normal oxygen saturations  Appropriate for discharge and outpatient management  Disposition  Final diagnoses:   Encounter for laboratory testing for COVID-19 virus     Time reflects when diagnosis was documented in both MDM as applicable and the Disposition within this note     Time User Action Codes Description Comment    9/22/2021  2:28 PM Wayne Woodward Add [R36 550] Encounter for laboratory testing for COVID-19 virus       ED Disposition     ED Disposition Condition Date/Time Comment    Discharge Stable Wed Sep 22, 2021  2:27 PM Sylwia Duncan discharge to home/self care              Follow-up Information     Follow up With Specialties Details Why Contact Info    Melyssa Collier MD Family Medicine  As needed [address] [facility] [address]  [phone]            Patient's Medications   Discharge Prescriptions    No medications on file     No discharge procedures on file      PDMP Review     None          ED Provider  Electronically Signed by           Tammy Avila MD  09/22/21 8151

## 2022-08-14 ENCOUNTER — APPOINTMENT (EMERGENCY)
Dept: ULTRASOUND IMAGING | Facility: HOSPITAL | Age: 27
End: 2022-08-14
Payer: COMMERCIAL

## 2022-08-14 ENCOUNTER — HOSPITAL ENCOUNTER (EMERGENCY)
Facility: HOSPITAL | Age: 27
Discharge: HOME | End: 2022-08-14
Attending: EMERGENCY MEDICINE
Payer: COMMERCIAL

## 2022-08-14 VITALS
WEIGHT: 94.36 LBS | RESPIRATION RATE: 16 BRPM | DIASTOLIC BLOOD PRESSURE: 74 MMHG | BODY MASS INDEX: 17.36 KG/M2 | OXYGEN SATURATION: 100 % | SYSTOLIC BLOOD PRESSURE: 121 MMHG | HEART RATE: 82 BPM | HEIGHT: 62 IN | TEMPERATURE: 98.3 F

## 2022-08-14 DIAGNOSIS — R11.0 NAUSEA: ICD-10-CM

## 2022-08-14 DIAGNOSIS — R10.9 ABDOMINAL PAIN: Primary | ICD-10-CM

## 2022-08-14 LAB
ALBUMIN SERPL BCP-MCNC: 3.9 G/DL (ref 3.5–5)
ALP SERPL-CCNC: 53 U/L (ref 46–116)
ALT SERPL W P-5'-P-CCNC: 18 U/L (ref 12–78)
ANION GAP SERPL CALCULATED.3IONS-SCNC: 9 MMOL/L (ref 4–13)
AST SERPL W P-5'-P-CCNC: 19 U/L (ref 5–45)
BACTERIA UR QL AUTO: ABNORMAL /HPF
BASOPHILS # BLD AUTO: 0.03 THOUSANDS/ΜL (ref 0–0.1)
BASOPHILS NFR BLD AUTO: 1 % (ref 0–1)
BILIRUB DIRECT SERPL-MCNC: 0.27 MG/DL (ref 0–0.2)
BILIRUB SERPL-MCNC: 1.11 MG/DL (ref 0.2–1)
BILIRUB UR QL STRIP: NEGATIVE
BUN SERPL-MCNC: 13 MG/DL (ref 5–25)
CALCIUM SERPL-MCNC: 8.8 MG/DL (ref 8.3–10.1)
CHLORIDE SERPL-SCNC: 104 MMOL/L (ref 96–108)
CLARITY UR: ABNORMAL
CO2 SERPL-SCNC: 26 MMOL/L (ref 21–32)
COARSE GRAN CASTS URNS QL MICRO: ABNORMAL /LPF
COLOR UR: YELLOW
CREAT SERPL-MCNC: 0.78 MG/DL (ref 0.6–1.3)
EOSINOPHIL # BLD AUTO: 0.01 THOUSAND/ΜL (ref 0–0.61)
EOSINOPHIL NFR BLD AUTO: 0 % (ref 0–6)
ERYTHROCYTE [DISTWIDTH] IN BLOOD BY AUTOMATED COUNT: 12.3 % (ref 11.6–15.1)
EXT PREG TEST URINE: NEGATIVE
EXT. CONTROL ED NAV: NORMAL
FLUAV RNA RESP QL NAA+PROBE: NEGATIVE
FLUBV RNA RESP QL NAA+PROBE: NEGATIVE
GFR SERPL CREATININE-BSD FRML MDRD: 105 ML/MIN/1.73SQ M
GLUCOSE SERPL-MCNC: 89 MG/DL (ref 65–140)
GLUCOSE SERPL-MCNC: 94 MG/DL (ref 65–140)
GLUCOSE UR STRIP-MCNC: NEGATIVE MG/DL
HCT VFR BLD AUTO: 37.7 % (ref 34.8–46.1)
HGB BLD-MCNC: 12.9 G/DL (ref 11.5–15.4)
HGB UR QL STRIP.AUTO: NEGATIVE
IMM GRANULOCYTES # BLD AUTO: 0.01 THOUSAND/UL (ref 0–0.2)
IMM GRANULOCYTES NFR BLD AUTO: 0 % (ref 0–2)
KETONES UR STRIP-MCNC: ABNORMAL MG/DL
LEUKOCYTE ESTERASE UR QL STRIP: NEGATIVE
LIPASE SERPL-CCNC: 102 U/L (ref 73–393)
LYMPHOCYTES # BLD AUTO: 1.42 THOUSANDS/ΜL (ref 0.6–4.47)
LYMPHOCYTES NFR BLD AUTO: 23 % (ref 14–44)
MCH RBC QN AUTO: 32.7 PG (ref 26.8–34.3)
MCHC RBC AUTO-ENTMCNC: 34.2 G/DL (ref 31.4–37.4)
MCV RBC AUTO: 95 FL (ref 82–98)
MONOCYTES # BLD AUTO: 0.7 THOUSAND/ΜL (ref 0.17–1.22)
MONOCYTES NFR BLD AUTO: 11 % (ref 4–12)
MUCOUS THREADS UR QL AUTO: ABNORMAL
NEUTROPHILS # BLD AUTO: 4.04 THOUSANDS/ΜL (ref 1.85–7.62)
NEUTS SEG NFR BLD AUTO: 65 % (ref 43–75)
NITRITE UR QL STRIP: NEGATIVE
NON-SQ EPI CELLS URNS QL MICRO: ABNORMAL /HPF
NRBC BLD AUTO-RTO: 0 /100 WBCS
PH UR STRIP.AUTO: 7 [PH]
PLATELET # BLD AUTO: 159 THOUSANDS/UL (ref 149–390)
PMV BLD AUTO: 10.9 FL (ref 8.9–12.7)
POTASSIUM SERPL-SCNC: 3.7 MMOL/L (ref 3.5–5.3)
PROT SERPL-MCNC: 7.4 G/DL (ref 6.4–8.4)
PROT UR STRIP-MCNC: >=300 MG/DL
RBC # BLD AUTO: 3.95 MILLION/UL (ref 3.81–5.12)
RBC #/AREA URNS AUTO: ABNORMAL /HPF
RSV RNA RESP QL NAA+PROBE: NEGATIVE
SARS-COV-2 RNA RESP QL NAA+PROBE: NEGATIVE
SODIUM SERPL-SCNC: 139 MMOL/L (ref 135–147)
SP GR UR STRIP.AUTO: >=1.03 (ref 1–1.03)
UROBILINOGEN UR QL STRIP.AUTO: 0.2 E.U./DL
WBC # BLD AUTO: 6.21 THOUSAND/UL (ref 4.31–10.16)
WBC #/AREA URNS AUTO: ABNORMAL /HPF

## 2022-08-14 PROCEDURE — 96374 THER/PROPH/DIAG INJ IV PUSH: CPT

## 2022-08-14 PROCEDURE — 99284 EMERGENCY DEPT VISIT MOD MDM: CPT | Performed by: EMERGENCY MEDICINE

## 2022-08-14 PROCEDURE — 80076 HEPATIC FUNCTION PANEL: CPT | Performed by: EMERGENCY MEDICINE

## 2022-08-14 PROCEDURE — 0241U HB NFCT DS VIR RESP RNA 4 TRGT: CPT | Performed by: EMERGENCY MEDICINE

## 2022-08-14 PROCEDURE — 81025 URINE PREGNANCY TEST: CPT | Performed by: EMERGENCY MEDICINE

## 2022-08-14 PROCEDURE — 82948 REAGENT STRIP/BLOOD GLUCOSE: CPT

## 2022-08-14 PROCEDURE — 83690 ASSAY OF LIPASE: CPT | Performed by: EMERGENCY MEDICINE

## 2022-08-14 PROCEDURE — 76705 ECHO EXAM OF ABDOMEN: CPT

## 2022-08-14 PROCEDURE — 80048 BASIC METABOLIC PNL TOTAL CA: CPT | Performed by: EMERGENCY MEDICINE

## 2022-08-14 PROCEDURE — 85025 COMPLETE CBC W/AUTO DIFF WBC: CPT | Performed by: EMERGENCY MEDICINE

## 2022-08-14 PROCEDURE — 36415 COLL VENOUS BLD VENIPUNCTURE: CPT | Performed by: EMERGENCY MEDICINE

## 2022-08-14 PROCEDURE — 81001 URINALYSIS AUTO W/SCOPE: CPT | Performed by: EMERGENCY MEDICINE

## 2022-08-14 PROCEDURE — 96361 HYDRATE IV INFUSION ADD-ON: CPT

## 2022-08-14 PROCEDURE — 99285 EMERGENCY DEPT VISIT HI MDM: CPT

## 2022-08-14 RX ORDER — ONDANSETRON 2 MG/ML
4 INJECTION INTRAMUSCULAR; INTRAVENOUS ONCE
Status: COMPLETED | OUTPATIENT
Start: 2022-08-14 | End: 2022-08-14

## 2022-08-14 RX ORDER — ACETAMINOPHEN 325 MG/1
975 TABLET ORAL ONCE
Status: COMPLETED | OUTPATIENT
Start: 2022-08-14 | End: 2022-08-14

## 2022-08-14 RX ORDER — MAGNESIUM HYDROXIDE/ALUMINUM HYDROXICE/SIMETHICONE 120; 1200; 1200 MG/30ML; MG/30ML; MG/30ML
30 SUSPENSION ORAL ONCE
Status: COMPLETED | OUTPATIENT
Start: 2022-08-14 | End: 2022-08-14

## 2022-08-14 RX ADMIN — ACETAMINOPHEN 975 MG: 325 TABLET, FILM COATED ORAL at 16:48

## 2022-08-14 RX ADMIN — ONDANSETRON 4 MG: 2 INJECTION INTRAMUSCULAR; INTRAVENOUS at 16:47

## 2022-08-14 RX ADMIN — SODIUM CHLORIDE 1000 ML: 0.9 INJECTION, SOLUTION INTRAVENOUS at 16:50

## 2022-08-14 RX ADMIN — ALUMINUM HYDROXIDE, MAGNESIUM HYDROXIDE, AND SIMETHICONE 30 ML: 200; 200; 20 SUSPENSION ORAL at 16:49

## 2022-08-14 NOTE — ED ATTENDING ATTESTATION
8/14/2022  I, Hermelinda Gonzales MD, saw and evaluated the patient  I have discussed the patient with the resident/non-physician practitioner and agree with the resident's/non-physician practitioner's findings, Plan of Care, and MDM as documented in the resident's/non-physician practitioner's note, except where noted  All available labs and Radiology studies were reviewed  I was present for key portions of any procedure(s) performed by the resident/non-physician practitioner and I was immediately available to provide assistance  At this point I agree with the current assessment done in the Emergency Department  I have conducted an independent evaluation of this patient a history and physical is as follows:    ED Course         Critical Care Time  Procedures    23 y/o female with dry heaving, vomiting, RUQ abd  Pain worse with eating and feeling fatigued since this am   She took Tylenol and motrin without relief  She had one loose stool yest   No fevers, no cp, no sob, no cough  No vaginal discharge or vaginal bleeding  Well-appearing, no distress, RRR, CTA b/l, abd  RUQ abd  Tenderness,neg  reveles's sign, epigastric abd  Tenderness, no edema

## 2022-08-14 NOTE — ED PROVIDER NOTES
History  Chief Complaint   Patient presents with    Weakness - Generalized     Patient reports feeling weak, tingling to finger tips today, shakiness, increased thirst  Nausea, diarrhea   Depression            HPI  Patient is a 21-year-old female presenting with fatigue, dehydration, nausea and generalized myalgia  The patient also states that she had 1 episode of loose stool yesterday accompanied by abdominal tenderness  Woke up with the above symptoms this morning and stated that she did feel like she can go to work  She states that the turkey sandwich she had yesterday just did not feel right and attributes it to possible food poisoning  Patient took Tylenol last night with no symptom improvement  Patient denies fever, chills, chest pain, shortness of breath, extremity weakness  Prior to Admission Medications   Prescriptions Last Dose Informant Patient Reported?  Taking?   benzocaine (ORAJEL) 10 % mucosal gel   No No   Sig: Apply 1 application to the mouth or throat as needed for mucositis   ibuprofen (MOTRIN) 600 mg tablet   No No   Sig: Take 1 tablet (600 mg total) by mouth every 6 (six) hours as needed for mild pain for up to 10 days   lidocaine (LIDODERM) 5 %   No No   Sig: Apply 1 patch topically daily Remove & Discard patch within 12 hours or as directed by MD   prazosin (MINIPRESS) 2 mg capsule  Self Yes No   Sig: Take 2 mg by mouth daily at bedtime   traMADol (ULTRAM) 50 mg tablet   No No   Sig: Take 1 tablet (50 mg total) by mouth every 6 (six) hours as needed for moderate pain for up to 10 doses   traZODone (DESYREL) 50 mg tablet  Self Yes No   Sig: Take 50 mg by mouth daily at bedtime   valACYclovir (VALTREX) 500 mg tablet  Self Yes No   Sig: Take 500 mg by mouth daily      Facility-Administered Medications: None       Past Medical History:   Diagnosis Date    Anxiety     Bipolar disorder ([facility] [address] )     Depression     Psychiatric disorder        Past Surgical History:   Procedure Laterality Date    WRIST FRACTURE SURGERY Right     04/2019       Family History   Family history unknown: Yes     I have reviewed and agree with the history as documented  E-Cigarette/Vaping    E-Cigarette Use Never User      E-Cigarette/Vaping Substances     Social History     Tobacco Use    Smoking status: Never Smoker    Smokeless tobacco: Never Used   Vaping Use    Vaping Use: Never used   Substance Use Topics    Alcohol use: Yes     Comment: occasional    Drug use: Not Currently     Types: Marijuana     Comment: daily        Review of Systems   Constitutional: Negative for chills, diaphoresis, fever and unexpected weight change  HENT: Negative for ear pain and sore throat  Eyes: Negative for visual disturbance  Respiratory: Negative for cough, chest tightness and shortness of breath  Cardiovascular: Negative for chest pain and leg swelling  Gastrointestinal: Positive for diarrhea and nausea  Negative for abdominal distention, abdominal pain, constipation and vomiting  Endocrine: Negative  Genitourinary: Negative for difficulty urinating and dysuria  Musculoskeletal: Negative  Skin: Negative  Allergic/Immunologic: Negative  Neurological: Negative  Hematological: Negative  Psychiatric/Behavioral: Negative  All other systems reviewed and are negative  Physical Exam  ED Triage Vitals [08/14/22 1500]   Temperature Pulse Respirations Blood Pressure SpO2   98 3 °F (36 8 °C) 97 20 129/79 100 %      Temp Source Heart Rate Source Patient Position - Orthostatic VS BP Location FiO2 (%)   Oral Monitor Sitting Right arm --      Pain Score       No Pain             Orthostatic Vital Signs  Vitals:    08/14/22 1500 08/14/22 1659   BP: 129/79 121/74   Pulse: 97 82   Patient Position - Orthostatic VS: Sitting Sitting       Physical Exam  Vitals and nursing note reviewed  Constitutional:       General: She is not in acute distress  Appearance: Normal appearance   She is not ill-appearing  HENT:      Head: Normocephalic and atraumatic  Right Ear: External ear normal       Left Ear: External ear normal       Nose: Nose normal       Mouth/Throat:      Mouth: Mucous membranes are moist       Pharynx: Oropharynx is clear  Eyes:      General: No scleral icterus  Right eye: No discharge  Left eye: No discharge  Extraocular Movements: Extraocular movements intact  Conjunctiva/sclera: Conjunctivae normal       Pupils: Pupils are equal, round, and reactive to light  Cardiovascular:      Rate and Rhythm: Normal rate and regular rhythm  Pulses: Normal pulses  Heart sounds: Normal heart sounds  Pulmonary:      Effort: Pulmonary effort is normal       Breath sounds: Normal breath sounds  Abdominal:      General: Abdomen is flat  Bowel sounds are normal  There is no distension  Palpations: Abdomen is soft  Tenderness: There is abdominal tenderness (Right upper quadrant and epigastric)  There is no guarding or rebound  Musculoskeletal:         General: Normal range of motion  Cervical back: Normal range of motion and neck supple  Skin:     General: Skin is warm and dry  Capillary Refill: Capillary refill takes less than 2 seconds  Neurological:      General: No focal deficit present  Mental Status: She is alert and oriented to person, place, and time  Mental status is at baseline  Psychiatric:         Mood and Affect: Mood normal          Behavior: Behavior normal          Thought Content:  Thought content normal          Judgment: Judgment normal          ED Medications  Medications   sodium chloride 0 9 % bolus 1,000 mL (0 mL Intravenous Stopped 8/14/22 1824)   acetaminophen (TYLENOL) tablet 975 mg (975 mg Oral Given 8/14/22 1648)   ondansetron (ZOFRAN) injection 4 mg (4 mg Intravenous Given 8/14/22 1647)   aluminum-magnesium hydroxide-simethicone (MYLANTA) oral suspension 30 mL (30 mL Oral Given 8/14/22 1649) Diagnostic Studies  Results Reviewed     Procedure Component Value Units Date/Time    FLU/RSV/COVID - if FLU/RSV clinically relevant [[id number]]  (Normal) Collected: 08/14/22 1647    Lab Status: Final result Specimen: Nares from Nasopharyngeal Swab Updated: 08/14/22 8565     SARS-CoV-2 Negative     INFLUENZA A PCR Negative     INFLUENZA B PCR Negative     RSV PCR Negative    Narrative:      FOR PEDIATRIC PATIENTS - copy/paste COVID Guidelines URL to browser: https://[employer]/  [employer]    SARS-CoV-2 assay is a Nucleic Acid Amplification assay intended for the  qualitative detection of nucleic acid from SARS-CoV-2 in nasopharyngeal  swabs  Results are for the presumptive identification of SARS-CoV-2 RNA  Positive results are indicative of infection with SARS-CoV-2, the virus  causing COVID-19, but do not rule out bacterial infection or co-infection  with other viruses  Laboratories within the [place] and its  territories are required to report all positive results to the appropriate  public health authorities  Negative results do not preclude SARS-CoV-2  infection and should not be used as the sole basis for treatment or other  patient management decisions  Negative results must be combined with  clinical observations, patient history, and epidemiological information  This test has not been FDA cleared or approved  This test has been authorized by FDA under an Emergency Use Authorization  (EUA)  This test is only authorized for the duration of time the  declaration that circumstances exist justifying the authorization of the  emergency use of an in vitro diagnostic tests for detection of SARS-CoV-2  virus and/or diagnosis of COVID-19 infection under section 564(b)(1) of  the Act, 21 U  S C  477BTD-6(A)(9), unless the authorization is terminated  or revoked sooner  The test has been validated but independent review by FDA  and CLIA is pending      Test performed using [employer] GeneXpert: This RT-PCR assay targets N2,  a region unique to SARS-CoV-2  A conserved region in the E-gene was chosen  for pan-Sarbecovirus detection which includes SARS-CoV-2      Urine Microscopic [[id number]]  (Abnormal) Collected: 08/14/22 1651    Lab Status: Final result Specimen: Urine, Clean Catch Updated: 08/14/22 1723     RBC, UA None Seen /hpf      WBC, UA 1-2 /hpf      Epithelial Cells Occasional /hpf      Bacteria, UA Innumerable /hpf      COARSE GRANULAR CASTS 3-4 /lpf      MUCUS THREADS Occasional    Lipase [[id number]]  (Normal) Collected: 08/14/22 1647    Lab Status: Final result Specimen: Blood from Arm, Right Updated: 08/14/22 1718     Lipase 102 u/L     Basic metabolic panel [[id number]] Collected: 08/14/22 1647    Lab Status: Final result Specimen: Blood from Arm, Right Updated: 08/14/22 1718     Sodium 139 mmol/L      Potassium 3 7 mmol/L      Chloride 104 mmol/L      CO2 26 mmol/L      ANION GAP 9 mmol/L      BUN 13 mg/dL      Creatinine 0 78 mg/dL      Glucose 94 mg/dL      Calcium 8 8 mg/dL      eGFR 105 ml/min/1 73sq m     Narrative:      [place] guidelines for Chronic Kidney Disease (CKD):     Stage 1 with normal or high GFR (GFR > 90 mL/min/1 73 square meters)    Stage 2 Mild CKD (GFR = 60-89 mL/min/1 73 square meters)    Stage 3A Moderate CKD (GFR = 45-59 mL/min/1 73 square meters)    Stage 3B Moderate CKD (GFR = 30-44 mL/min/1 73 square meters)    Stage 4 Severe CKD (GFR = 15-29 mL/min/1 73 square meters)    Stage 5 End Stage CKD (GFR <15 mL/min/1 73 square meters)  Note: GFR calculation is accurate only with a steady state creatinine    Hepatic function panel [[id number]]  (Abnormal) Collected: 08/14/22 1647    Lab Status: Final result Specimen: Blood from Arm, Right Updated: 08/14/22 1718     Total Bilirubin 1 11 mg/dL      Bilirubin, Direct 0 27 mg/dL      Alkaline Phosphatase 53 U/L      AST 19 U/L      ALT 18 U/L      Total Protein 7 4 g/dL      Albumin 3 9 g/dL     UA (URINE) with reflex to Scope [[id number]]  (Abnormal) Collected: 08/14/22 1651    Lab Status: Final result Specimen: Urine, Clean Catch Updated: 08/14/22 1702     Color, UA Yellow     Clarity, UA Slightly Cloudy     Specific Gravity, UA >=1 030     pH, UA 7 0     Leukocytes, UA Negative     Nitrite, UA Negative     Protein, UA >=300 mg/dl      Glucose, UA Negative mg/dl      Ketones, UA 15 (1+) mg/dl      Urobilinogen, UA 0 2 E U /dl      Bilirubin, UA Negative     Occult Blood, UA Negative    CBC and differential [[id number]] Collected: 08/14/22 1647    Lab Status: Final result Specimen: Blood from Arm, Right Updated: 08/14/22 1701     WBC 6 21 Thousand/uL      RBC 3 95 Million/uL      Hemoglobin 12 9 g/dL      Hematocrit 37 7 %      MCV 95 fL      MCH 32 7 pg      MCHC 34 2 g/dL      RDW 12 3 %      MPV 10 9 fL      Platelets 455 Thousands/uL      nRBC 0 /100 WBCs      Neutrophils Relative 65 %      Immat GRANS % 0 %      Lymphocytes Relative 23 %      Monocytes Relative 11 %      Eosinophils Relative 0 %      Basophils Relative 1 %      Neutrophils Absolute 4 04 Thousands/µL      Immature Grans Absolute 0 01 Thousand/uL      Lymphocytes Absolute 1 42 Thousands/µL      Monocytes Absolute 0 70 Thousand/µL      Eosinophils Absolute 0 01 Thousand/µL      Basophils Absolute 0 03 Thousands/µL     POCT pregnancy, urine [[id number]]  (Normal) Resulted: 08/14/22 1659    Lab Status: Final result Updated: 08/14/22 1700     EXT PREG TEST UR (Ref: Negative) negative     Control valid    Fingerstick Glucose (POCT) [[id number]]  (Normal) Collected: 08/14/22 1509    Lab Status: Final result Updated: 08/14/22 1511     POC Glucose 89 mg/dl                  US right upper quadrant   Final Result by Nico Bee MD (08/14 1803)      Small pericholecystic fluid is a nonspecific finding  No gallstones or other secondary signs of cholecystitis  Otherwise normal appearance of the abdomen  Workstation performed: [id number]               Procedures  Procedures      ED Course                             SBIRT 22yo+    Flowsheet Row Most Recent Value   SBIRT (23 yo +)    In order to provide better care to our patients, we are screening all of our patients for alcohol and drug use  Would it be okay to ask you these screening questions? No Filed at: 08/14/2022 1559                [facility]  Number of Diagnoses or Management Options  Abdominal pain  Nausea  Diagnosis management comments:    Patient is a 22-year-old female presenting with nausea, diarrhea, abdominal pain  Will administer IV fluids due to diarrhea and vomiting  Abdominal panel obtained  Due to right upper quadrant tenderness will obtain ultrasound of gallbladder  Ultrasound was negative  Patient states that after getting a GI cocktail patient has significant relief of her symptoms  Possible gastritis versus viral gastroenteritis  Patient discharged with return precautions provided        Disposition  Final diagnoses:   Abdominal pain   Nausea     Time reflects when diagnosis was documented in both MDM as applicable and the Disposition within this note     Time User Action Codes Description Comment    8/14/2022  6:18 PM Vermell Reagin Add [R10 9] Abdominal pain     8/14/2022  6:18 PM Vermell Reagin Add [R11 0] Nausea       ED Disposition     ED Disposition   Discharge    Condition   Stable    Date/Time   Sun Aug 14, 2022  6:18 PM    [address]ette discharge to home/self care                 Follow-up Information     Follow up With Specialties Details Why [id number] Emily Bridges MD [facility]   [address] [place] [address]  [phone]            Discharge Medication List as of 8/14/2022  6:22 PM      CONTINUE these medications which have NOT CHANGED    Details   benzocaine (ORAJEL) 10 % mucosal gel Apply 1 application to the mouth or throat as needed for mucositis, Starting Tue 9/21/2021, Normal      ibuprofen (MOTRIN) 600 mg tablet Take 1 tablet (600 mg total) by mouth every 6 (six) hours as needed for mild pain for up to 10 days, Starting Mon 12/30/2019, Until Thu 1/9/2020, Print      lidocaine (LIDODERM) 5 % Apply 1 patch topically daily Remove & Discard patch within 12 hours or as directed by MD, Starting Mon 12/30/2019, Print      prazosin (MINIPRESS) 2 mg capsule Take 2 mg by mouth daily at bedtime, Historical Med      traMADol (ULTRAM) 50 mg tablet Take 1 tablet (50 mg total) by mouth every 6 (six) hours as needed for moderate pain for up to 10 doses, Starting Tue 9/21/2021, Normal      traZODone (DESYREL) 50 mg tablet Take 50 mg by mouth daily at bedtime, Historical Med      valACYclovir (VALTREX) 500 mg tablet Take 500 mg by mouth daily, Starting Tue 3/13/2018, Historical Med           No discharge procedures on file  PDMP Review     None           ED Provider  Attending physically available and evaluated Anibal Dent I managed the patient along with the ED Attending      Electronically Signed by         Theo English MD  08/16/22 1061

## 2022-08-14 NOTE — Clinical Note
Ivy Rosa was seen and treated in our emergency department on 8/14/2022  Diagnosis: Abdominal pain    Harry Quant  may return to work on return date  She may return on this date: 08/15/2022         If you have any questions or concerns, please don't hesitate to call        Henry Cassidy MD    ______________________________           _______________          _______________  [facility] Representative                              Date                                Time

## 2024-03-26 ENCOUNTER — HOSPITAL ENCOUNTER (EMERGENCY)
Facility: HOSPITAL | Age: 29
Discharge: HOME | End: 2024-03-26
Attending: EMERGENCY MEDICINE
Payer: COMMERCIAL

## 2024-03-26 VITALS
TEMPERATURE: 97.7 F | OXYGEN SATURATION: 98 % | HEART RATE: 62 BPM | SYSTOLIC BLOOD PRESSURE: 111 MMHG | BODY MASS INDEX: 19.52 KG/M2 | WEIGHT: 106.7 LBS | RESPIRATION RATE: 18 BRPM | DIASTOLIC BLOOD PRESSURE: 56 MMHG

## 2024-03-26 DIAGNOSIS — B34.9 ACUTE VIRAL SYNDROME: Primary | ICD-10-CM

## 2024-03-26 LAB
FLUAV RNA RESP QL NAA+PROBE: NEGATIVE
FLUBV RNA RESP QL NAA+PROBE: POSITIVE
RSV RNA RESP QL NAA+PROBE: NEGATIVE
S PYO DNA THROAT QL NAA+PROBE: NOT DETECTED
SARS-COV-2 RNA RESP QL NAA+PROBE: NEGATIVE

## 2024-03-26 PROCEDURE — 96372 THER/PROPH/DIAG INJ SC/IM: CPT

## 2024-03-26 PROCEDURE — 0241U HB NFCT DS VIR RESP RNA 4 TRGT: CPT | Performed by: PHYSICIAN ASSISTANT

## 2024-03-26 PROCEDURE — 87651 STREP A DNA AMP PROBE: CPT | Performed by: PHYSICIAN ASSISTANT

## 2024-03-26 PROCEDURE — 99283 EMERGENCY DEPT VISIT LOW MDM: CPT

## 2024-03-26 PROCEDURE — 99284 EMERGENCY DEPT VISIT MOD MDM: CPT | Performed by: PHYSICIAN ASSISTANT

## 2024-03-26 RX ORDER — KETOROLAC TROMETHAMINE 30 MG/ML
15 INJECTION, SOLUTION INTRAMUSCULAR; INTRAVENOUS ONCE
Status: COMPLETED | OUTPATIENT
Start: 2024-03-26 | End: 2024-03-26

## 2024-03-26 RX ADMIN — KETOROLAC TROMETHAMINE 15 MG: 30 INJECTION, SOLUTION INTRAMUSCULAR; INTRAVENOUS at 14:32

## 2024-03-26 NOTE — Clinical Note
Megan Edwards accompanied Petrona Chacko to the emergency department on 3/26/2024.    Return date if applicable: 03/27/2024        If you have any questions or concerns, please don't hesitate to call.      Yesy Campos PA-C

## 2024-03-26 NOTE — Clinical Note
Petrona Chacko was seen and treated in our emergency department on 3/26/2024.    No restrictions            Diagnosis:     Petrona  .    She may return on this date: 03/27/2024         If you have any questions or concerns, please don't hesitate to call.      Yesy Campos PA-C    ______________________________           _______________          _______________  Hospital Representative                              Date                                Time

## 2024-03-26 NOTE — DISCHARGE INSTRUCTIONS
DISCHARGE INSTRUCTIONS:    FOLLOW UP WITH YOUR PRIMARY CARE PROVIDER OR THE [facility]. MAKE AN APPOINTMENT TO BE SEEN.     TAKE TYLENOL OR MOTRIN FOR PAIN.    REST AND DRINK PLENTY OF FLUIDS.    IF SYMPTOMS WORSEN OR NEW SYMPTOMS ARISE, RETURN TO THE ER TO BE SEEN.

## 2024-03-26 NOTE — ED PROVIDER NOTES
History  Chief Complaint   Patient presents with    Sore Throat     Sore throat, headache. Sx starting last night. Taking tylenol without relief.      28y.o female with PMH of anxiety, bipolar and depression presents to the ER for fever, sore throat, cough and headache for 1 day. Patient tried taking Tylenol for symptoms, which did help. Symptoms are constant. Associated symptoms: nausea. She denies chills, rhinorrhea/congestion, chest pain, dyspnea, vomiting, diarrhea, abdominal pain or weakness.      History provided by:  Patient   used: No        Prior to Admission Medications   Prescriptions Last Dose Informant Patient Reported? Taking?   benzocaine (ORAJEL) 10 % mucosal gel   No No   Sig: Apply 1 application to the mouth or throat as needed for mucositis   ibuprofen (MOTRIN) 600 mg tablet   No No   Sig: Take 1 tablet (600 mg total) by mouth every 6 (six) hours as needed for mild pain for up to 10 days   lidocaine (LIDODERM) 5 %   No No   Sig: Apply 1 patch topically daily Remove & Discard patch within 12 hours or as directed by MD   prazosin (MINIPRESS) 2 mg capsule  Self Yes No   Sig: Take 2 mg by mouth daily at bedtime   traMADol (ULTRAM) 50 mg tablet   No No   Sig: Take 1 tablet (50 mg total) by mouth every 6 (six) hours as needed for moderate pain for up to 10 doses   traZODone (DESYREL) 50 mg tablet  Self Yes No   Sig: Take 50 mg by mouth daily at bedtime   valACYclovir (VALTREX) 500 mg tablet  Self Yes No   Sig: Take 500 mg by mouth daily      Facility-Administered Medications: None       Past Medical History:   Diagnosis Date    Anxiety     Bipolar disorder (HCC)     Depression     Psychiatric disorder        Past Surgical History:   Procedure Laterality Date    WRIST FRACTURE SURGERY Right     04/2019       Family History   Family history unknown: Yes     I have reviewed and agree with the history as documented.    E-Cigarette/Vaping    E-Cigarette Use Never User       E-Cigarette/Vaping Substances     Social History     Tobacco Use    Smoking status: Never    Smokeless tobacco: Never   Vaping Use    Vaping status: Never Used   Substance Use Topics    Alcohol use: Yes     Comment: occasional    Drug use: Not Currently     Types: Marijuana     Comment: daily       Review of Systems   Constitutional:  Positive for fever. Negative for activity change, appetite change and chills.   HENT:  Positive for sore throat. Negative for congestion, drooling, ear discharge, ear pain, facial swelling and rhinorrhea.    Eyes:  Negative for redness.   Respiratory:  Positive for cough. Negative for shortness of breath.    Cardiovascular:  Negative for chest pain.   Gastrointestinal:  Positive for nausea. Negative for abdominal pain, diarrhea and vomiting.   Musculoskeletal:  Negative for neck stiffness.   Skin:  Negative for rash.   Allergic/Immunologic: Negative for food allergies.   Neurological:  Positive for headaches. Negative for weakness and numbness.       Physical Exam  Physical Exam  Vitals and nursing note reviewed.   Constitutional:       General: She is not in acute distress.     Appearance: She is not toxic-appearing.   HENT:      Head: Normocephalic and atraumatic.      Nose: Nose normal.      Mouth/Throat:      Lips: Pink. No lesions.      Mouth: Mucous membranes are moist.      Pharynx: Uvula midline. Posterior oropharyngeal erythema present. No pharyngeal swelling, oropharyngeal exudate or uvula swelling.      Tonsils: No tonsillar exudate or tonsillar abscesses.   Eyes:      Conjunctiva/sclera: Conjunctivae normal.   Neck:      Trachea: No tracheal deviation.   Cardiovascular:      Rate and Rhythm: Normal rate and regular rhythm.      Heart sounds: Normal heart sounds, S1 normal and S2 normal. No murmur heard.     No friction rub. No gallop.   Pulmonary:      Effort: Pulmonary effort is normal. No respiratory distress.      Breath sounds: Normal breath sounds. No decreased  breath sounds, wheezing, rhonchi or rales.   Chest:      Chest wall: No tenderness.   Abdominal:      General: There is no distension.   Musculoskeletal:      Cervical back: Normal range of motion and neck supple.   Skin:     General: Skin is warm and dry.      Findings: No rash.   Neurological:      Mental Status: She is alert.      GCS: GCS eye subscore is 4. GCS verbal subscore is 5. GCS motor subscore is 6.   Psychiatric:         Mood and Affect: Mood normal.         Vital Signs  ED Triage Vitals   Temperature Pulse Respirations Blood Pressure SpO2   03/26/24 1318 03/26/24 1318 03/26/24 1318 03/26/24 1318 03/26/24 1318   97.7 °F (36.5 °C) 62 18 111/56 98 %      Temp src Heart Rate Source Patient Position - Orthostatic VS BP Location FiO2 (%)   -- -- -- -- --             Pain Score       03/26/24 1432       8           Vitals:    03/26/24 1318   BP: 111/56   Pulse: 62         Visual Acuity      ED Medications  Medications   ketorolac (TORADOL) injection 15 mg (15 mg Intramuscular Given 3/26/24 1432)       Diagnostic Studies  Results Reviewed       Procedure Component Value Units Date/Time    Strep A PCR [[id number]]  (Normal) Collected: 03/26/24 1430    Lab Status: Final result Specimen: Throat Updated: 03/26/24 1510     STREP A PCR Not Detected    FLU/RSV/COVID - if FLU/RSV clinically relevant [[id number]] Collected: 03/26/24 1430    Lab Status: In process Specimen: Nares from Nose Updated: 03/26/24 1432                   No orders to display              Procedures  Procedures         ED Course                               SBIRT 22yo+      Flowsheet Row Most Recent Value   Initial Alcohol Screen: US AUDIT-C     1. How often do you have a drink containing alcohol? 0 Filed at: 03/26/2024 1328   2. How many drinks containing alcohol do you have on a typical day you are drinking?  0 Filed at: 03/26/2024 1328   3a. Male UNDER 65: How often do you have five or more drinks on one occasion? 0 Filed at: 03/26/2024 1328    3b. FEMALE Any Age, or MALE 65+: How often do you have 4 or more drinks on one occassion? 0 Filed at: 03/26/2024 1328   Audit-C Score 0 Filed at: 03/26/2024 1328   ROD: How many times in the past year have you...    Used an illegal drug or used a prescription medication for non-medical reasons? Never Filed at: 03/26/2024 1328                      Medical Decision Making  28y.o female presents to the ER for fever, sore throat, cough and headache for 1 day. Vitals are stable. Patient is in no acute distress. On exam, throat is red. No swelling, exudate, abscess or trismus seen. Uvula is midline. No trouble swallowing or handling secretions. No neck swelling. Breathing is non-labored. No tachypnea or accessory muscle use. Lungs are clear. Heart is regular rate and rhythm. Abdomen is not distended. Will check covid/flu test and strep test. Will give Toradol for pain.    1505 - Patient reports improvement in symptoms. Will discharge.    The management plan was discussed in detail with the patient at bedside and all questions were answered.  Prior to discharge, we provided both verbal and written instructions.  We discussed with the patient the signs and symptoms for which to return to the emergency department.  All questions were answered and patient was comfortable with the plan of care and discharged to home.  Instructed the patient to follow up with the primary care provider and/or specialist provided and their written instructions.  The patient verbalized understanding of our discussion and plan of care, and agrees to return to the Emergency Department for concerns and progression of illness.    At discharge, I instructed the patient to:  -follow up with pcp  -take Tylenol or Motrin for pain or fever  -rest and drink plenty of fluids  -return to the ER if symptoms worsened or new symptoms arose  Patient agreed to this plan and was stable at time of discharge.     Problems Addressed:  Acute viral syndrome: acute  illness or injury    Amount and/or Complexity of Data Reviewed  Independent Historian:      Details: Patient is historian  Labs: ordered.    Risk  Prescription drug management.             Disposition  Final diagnoses:   Acute viral syndrome     Time reflects when diagnosis was documented in both MDM as applicable and the Disposition within this note       Time User Action Codes Description Comment    3/26/2024  3:07 PM Yesy Campos Add [B34.9] Acute viral syndrome           ED Disposition       ED Disposition   Discharge    Condition   Stable    Date/Time   Tue Mar 26, 2024 1507    Comment   Petrona Chacko discharge to home/self care.                   Follow-up Information       Follow up With Specialties Details Why Contact Info    Rigo Rico MD Family Medicine Schedule an appointment as soon as possible for a visit  As needed [address] [facility] [address] [phone]              Patient's Medications   Discharge Prescriptions    No medications on file       No discharge procedures on file.    PDMP Review       None            ED Provider  Electronically Signed by             Yesy Campos PA-C  03/26/24 2610

## 2024-05-15 ENCOUNTER — HOSPITAL ENCOUNTER (EMERGENCY)
Facility: HOSPITAL | Age: 29
Discharge: HOME | End: 2024-05-15
Attending: EMERGENCY MEDICINE
Payer: COMMERCIAL

## 2024-05-15 VITALS
HEART RATE: 87 BPM | DIASTOLIC BLOOD PRESSURE: 60 MMHG | OXYGEN SATURATION: 98 % | RESPIRATION RATE: 18 BRPM | WEIGHT: 108.03 LBS | TEMPERATURE: 97.8 F | SYSTOLIC BLOOD PRESSURE: 108 MMHG | BODY MASS INDEX: 19.76 KG/M2

## 2024-05-15 DIAGNOSIS — R68.84 JAW PAIN: ICD-10-CM

## 2024-05-15 DIAGNOSIS — S03.40XA TMJ (SPRAIN OF TEMPOROMANDIBULAR JOINT), INITIAL ENCOUNTER: Primary | ICD-10-CM

## 2024-05-15 PROCEDURE — 99283 EMERGENCY DEPT VISIT LOW MDM: CPT

## 2024-05-15 PROCEDURE — 99284 EMERGENCY DEPT VISIT MOD MDM: CPT | Performed by: PHYSICIAN ASSISTANT

## 2024-05-15 RX ORDER — ACETAMINOPHEN 500 MG
500 TABLET ORAL EVERY 6 HOURS PRN
Qty: 30 TABLET | Refills: 0 | Status: SHIPPED | OUTPATIENT
Start: 2024-05-15

## 2024-05-15 RX ORDER — IBUPROFEN 400 MG/1
400 TABLET ORAL EVERY 6 HOURS PRN
Qty: 30 TABLET | Refills: 0 | Status: SHIPPED | OUTPATIENT
Start: 2024-05-15

## 2024-05-15 RX ORDER — IBUPROFEN 400 MG/1
800 TABLET ORAL ONCE
Status: COMPLETED | OUTPATIENT
Start: 2024-05-15 | End: 2024-05-15

## 2024-05-15 RX ORDER — ACETAMINOPHEN 325 MG/1
975 TABLET ORAL ONCE
Status: COMPLETED | OUTPATIENT
Start: 2024-05-15 | End: 2024-05-15

## 2024-05-15 RX ADMIN — IBUPROFEN 800 MG: 400 TABLET, FILM COATED ORAL at 17:27

## 2024-05-15 RX ADMIN — ACETAMINOPHEN 975 MG: 325 TABLET, FILM COATED ORAL at 17:27

## 2024-05-15 NOTE — ED PROVIDER NOTES
History  Chief Complaint   Patient presents with    Jaw Pain     Patient was wrestling and hurt left side of jaw. Reports heard a clicking sensation and had blood in her mouth when initially injury happened.        Facial Injury  Location:  Mouth  Time since incident:  2 hours  Pain details:     Quality:  Aching    Severity:  Mild  Associated symptoms: ear pain    Associated symptoms: no altered mental status, no congestion, no difficulty breathing, no epistaxis and no malocclusion    Risk factors: no bone disorder        Prior to Admission Medications   Prescriptions Last Dose Informant Patient Reported? Taking?   benzocaine (ORAJEL) 10 % mucosal gel   No No   Sig: Apply 1 application to the mouth or throat as needed for mucositis   ibuprofen (MOTRIN) 600 mg tablet   No No   Sig: Take 1 tablet (600 mg total) by mouth every 6 (six) hours as needed for mild pain for up to 10 days   lidocaine (LIDODERM) 5 %   No No   Sig: Apply 1 patch topically daily Remove & Discard patch within 12 hours or as directed by MD   prazosin (MINIPRESS) 2 mg capsule  Self Yes No   Sig: Take 2 mg by mouth daily at bedtime   traMADol (ULTRAM) 50 mg tablet   No No   Sig: Take 1 tablet (50 mg total) by mouth every 6 (six) hours as needed for moderate pain for up to 10 doses   traZODone (DESYREL) 50 mg tablet  Self Yes No   Sig: Take 50 mg by mouth daily at bedtime   valACYclovir (VALTREX) 500 mg tablet  Self Yes No   Sig: Take 500 mg by mouth daily      Facility-Administered Medications: None       Past Medical History:   Diagnosis Date    Anxiety     Bipolar disorder (HCC)     Depression     Psychiatric disorder        Past Surgical History:   Procedure Laterality Date    WRIST FRACTURE SURGERY Right     04/2019       Family History   Family history unknown: Yes     I have reviewed and agree with the history as documented.    E-Cigarette/Vaping    E-Cigarette Use Never User      E-Cigarette/Vaping Substances     Social History     Tobacco  Use    Smoking status: Never    Smokeless tobacco: Never   Vaping Use    Vaping status: Never Used   Substance Use Topics    Alcohol use: Yes     Comment: occasional    Drug use: Not Currently     Types: Marijuana     Comment: daily       Review of Systems   Constitutional:  Negative for fever.   HENT:  Positive for ear pain. Negative for congestion, facial swelling, nosebleeds, sore throat and trouble swallowing.         Left TMJ area tenderness and discomfort.   All other systems reviewed and are negative.      Physical Exam  Physical Exam  Vitals reviewed.   Constitutional:       Appearance: Normal appearance.   HENT:      Head: Normocephalic.      Right Ear: Tympanic membrane normal.      Nose: No congestion or rhinorrhea.      Mouth/Throat:      Mouth: Mucous membranes are moist.      Pharynx: Oropharynx is clear.      Comments: Mild tenderness left TMJ no malocclusion appreciated no clicking appreciated during exam.  Patient states buccal mucosal bleeding prior to evaluation I was unable to appreciate any blood products in the mouth.  Eyes:      Pupils: Pupils are equal, round, and reactive to light.   Cardiovascular:      Rate and Rhythm: Normal rate.   Pulmonary:      Effort: Pulmonary effort is normal.   Abdominal:      General: There is no distension.   Musculoskeletal:         General: No swelling.      Cervical back: Normal range of motion.   Skin:     General: Skin is warm.   Neurological:      General: No focal deficit present.      Mental Status: She is alert.   Psychiatric:         Mood and Affect: Mood normal.         Vital Signs  ED Triage Vitals [05/15/24 1700]   Temperature Pulse Respirations Blood Pressure SpO2   97.8 °F (36.6 °C) 87 18 108/60 98 %      Temp Source Heart Rate Source Patient Position - Orthostatic VS BP Location FiO2 (%)   Oral Monitor Sitting Right arm --      Pain Score       7           Vitals:    05/15/24 1700   BP: 108/60   Pulse: 87   Patient Position - Orthostatic VS:  Sitting         Visual Acuity      ED Medications  Medications   ibuprofen (MOTRIN) tablet 800 mg (800 mg Oral Given 5/15/24 1727)   acetaminophen (TYLENOL) tablet 975 mg (975 mg Oral Given 5/15/24 1727)       Diagnostic Studies  Results Reviewed       None                   No orders to display              Procedures  Procedures         ED Course                                             Medical Decision Making  29-year-old female presents emergency department for left TMJ pain.  Patient states that she was playfully wrestling with one of her friends and was placed in a head lock which caused limitation of her jaw with immediate onset of left TMJ area pain.  Patient also states she had some blood in her mouth where she feels as though her teeth had injured soft tissue.  On exam no evidence of malocclusion.  There is no evidence of buccal mucosal injury or blood in the mouth.  Patient does have tenderness along the left TMJ.  Did not feel that x-ray would aid in diagnosis or management patient this day.  At this time we will treat conservatively and encourage patient to take analgesic medications.  Will provide ENT information for follow-up.  Educated patient of diagnosis and home management.  Educated patient on timing for improvement.  Encourage close follow-up with ear nose and throat.  Educated on persistent or worsening signs and symptoms or any concern to either follow-up with ear nose and throat and/or return to the emergency department.  Patient admitted understanding was discharged.  Ambulatory condition.    Risk  OTC drugs.  Prescription drug management.             Disposition  Final diagnoses:   TMJ (sprain of temporomandibular joint), initial encounter   Jaw pain     Time reflects when diagnosis was documented in both MDM as applicable and the Disposition within this note       Time User Action Codes Description Comment    5/15/2024  5:23 PM Bobo Chávez Add [S03.40XA] TMJ (sprain of  temporomandibular joint), initial encounter     5/15/2024  5:24 PM Saira Bobo Add [R68.84] Jaw pain           ED Disposition       ED Disposition   Discharge    Condition   Stable    Date/Time   Wed May 15, 2024  5:23 PM    Comment   Petrona Chacko discharge to home/self care.                   Follow-up Information       Follow up With Specialties Details Why Contact Info    Rigo Rico MD Family Medicine   [facility] [facility] [address]  [phone]      Medardo Jane,  Otolaryngology   [address] [place] [address] [address]  [facility] [address]  [phone]              Discharge Medication List as of 5/15/2024  5:40 PM        START taking these medications    Details   acetaminophen (TYLENOL) 500 mg tablet Take 1 tablet (500 mg total) by mouth every 6 (six) hours as needed for mild pain or moderate pain, Starting Wed 5/15/2024, Print           CONTINUE these medications which have CHANGED    Details   ibuprofen (MOTRIN) 400 mg tablet Take 1 tablet (400 mg total) by mouth every 6 (six) hours as needed for mild pain or moderate pain, Starting Wed 5/15/2024, Print           CONTINUE these medications which have NOT CHANGED    Details   benzocaine (ORAJEL) 10 % mucosal gel Apply 1 application to the mouth or throat as needed for mucositis, Starting Tue 9/21/2021, Normal      lidocaine (LIDODERM) 5 % Apply 1 patch topically daily Remove & Discard patch within 12 hours or as directed by MD, Starting Mon 12/30/2019, Print      prazosin (MINIPRESS) 2 mg capsule Take 2 mg by mouth daily at bedtime, Historical Med      traMADol (ULTRAM) 50 mg tablet Take 1 tablet (50 mg total) by mouth every 6 (six) hours as needed for moderate pain for up to 10 doses, Starting Tue 9/21/2021, Normal      traZODone (DESYREL) 50 mg tablet Take 50 mg by mouth daily at bedtime, Historical Med      valACYclovir (VALTREX) 500 mg tablet Take 500 mg by mouth daily, Starting Tue 3/13/2018, Historical Med             No discharge  procedures on file.    PDMP Review       None            ED Provider  Electronically Signed by             Bobo Chávez PA-C  05/15/24 3337